# Patient Record
Sex: FEMALE | Race: WHITE | NOT HISPANIC OR LATINO
[De-identification: names, ages, dates, MRNs, and addresses within clinical notes are randomized per-mention and may not be internally consistent; named-entity substitution may affect disease eponyms.]

---

## 2019-12-27 ENCOUNTER — TRANSCRIPTION ENCOUNTER (OUTPATIENT)
Age: 49
End: 2019-12-27

## 2020-02-11 ENCOUNTER — RESULT REVIEW (OUTPATIENT)
Age: 50
End: 2020-02-11

## 2020-02-14 ENCOUNTER — APPOINTMENT (OUTPATIENT)
Dept: UROGYNECOLOGY | Facility: CLINIC | Age: 50
End: 2020-02-14
Payer: MEDICARE

## 2020-02-14 VITALS
HEART RATE: 74 BPM | DIASTOLIC BLOOD PRESSURE: 80 MMHG | WEIGHT: 280 LBS | HEIGHT: 62 IN | BODY MASS INDEX: 51.53 KG/M2 | SYSTOLIC BLOOD PRESSURE: 126 MMHG

## 2020-02-14 DIAGNOSIS — Z87.828 PERSONAL HISTORY OF OTHER (HEALED) PHYSICAL INJURY AND TRAUMA: ICD-10-CM

## 2020-02-14 DIAGNOSIS — E66.9 OBESITY, UNSPECIFIED: ICD-10-CM

## 2020-02-14 DIAGNOSIS — E07.9 DISORDER OF THYROID, UNSPECIFIED: ICD-10-CM

## 2020-02-14 DIAGNOSIS — Z78.9 OTHER SPECIFIED HEALTH STATUS: ICD-10-CM

## 2020-02-14 LAB
BILIRUB UR QL STRIP: NORMAL
CLARITY UR: CLEAR
COLLECTION METHOD: NORMAL
GLUCOSE UR-MCNC: NORMAL
HCG UR QL: 0.2 EU/DL
HGB UR QL STRIP.AUTO: NORMAL
KETONES UR-MCNC: NORMAL
LEUKOCYTE ESTERASE UR QL STRIP: NORMAL
NITRITE UR QL STRIP: NORMAL
PH UR STRIP: 7.5
PROT UR STRIP-MCNC: NORMAL
SP GR UR STRIP: 1.01

## 2020-02-14 PROCEDURE — 99202 OFFICE O/P NEW SF 15 MIN: CPT

## 2020-02-18 PROBLEM — Z87.828 HISTORY OF HEAD INJURY: Status: RESOLVED | Noted: 2020-02-18 | Resolved: 2020-02-18

## 2020-02-18 PROBLEM — E07.9 THYROID DISORDER: Status: ACTIVE | Noted: 2020-02-18

## 2020-02-18 PROBLEM — Z78.9 RARELY CONSUMES ALCOHOL: Status: ACTIVE | Noted: 2020-02-18

## 2020-02-18 PROBLEM — E66.9 OBESITY, UNSPECIFIED OBESITY SEVERITY, UNSPECIFIED OBESITY TYPE: Status: ACTIVE | Noted: 2020-02-18

## 2020-02-19 ENCOUNTER — TRANSCRIPTION ENCOUNTER (OUTPATIENT)
Age: 50
End: 2020-02-19

## 2020-03-06 ENCOUNTER — APPOINTMENT (OUTPATIENT)
Dept: UROGYNECOLOGY | Facility: CLINIC | Age: 50
End: 2020-03-06
Payer: MEDICARE

## 2020-03-06 ENCOUNTER — OUTPATIENT (OUTPATIENT)
Dept: OUTPATIENT SERVICES | Facility: HOSPITAL | Age: 50
LOS: 1 days | End: 2020-03-06
Payer: COMMERCIAL

## 2020-03-06 DIAGNOSIS — N39.3 STRESS INCONTINENCE (FEMALE) (MALE): ICD-10-CM

## 2020-03-06 DIAGNOSIS — Z01.818 ENCOUNTER FOR OTHER PREPROCEDURAL EXAMINATION: ICD-10-CM

## 2020-03-06 PROCEDURE — 51729 CYSTOMETROGRAM W/VP&UP: CPT

## 2020-03-06 PROCEDURE — 51797 INTRAABDOMINAL PRESSURE TEST: CPT | Mod: 26

## 2020-03-06 PROCEDURE — 51784 ANAL/URINARY MUSCLE STUDY: CPT | Mod: 26

## 2020-03-06 PROCEDURE — 51729 CYSTOMETROGRAM W/VP&UP: CPT | Mod: 26

## 2020-03-06 PROCEDURE — 51784 ANAL/URINARY MUSCLE STUDY: CPT

## 2020-03-06 PROCEDURE — 51797 INTRAABDOMINAL PRESSURE TEST: CPT

## 2020-03-18 ENCOUNTER — APPOINTMENT (OUTPATIENT)
Dept: UROGYNECOLOGY | Facility: CLINIC | Age: 50
End: 2020-03-18

## 2020-03-28 ENCOUNTER — INPATIENT (INPATIENT)
Facility: HOSPITAL | Age: 50
LOS: 15 days | Discharge: HOME | DRG: 871 | End: 2020-04-13
Attending: FAMILY MEDICINE | Admitting: HOSPITALIST
Payer: COMMERCIAL

## 2020-03-28 VITALS
HEART RATE: 92 BPM | WEIGHT: 272.93 LBS | DIASTOLIC BLOOD PRESSURE: 68 MMHG | OXYGEN SATURATION: 90 % | SYSTOLIC BLOOD PRESSURE: 97 MMHG | TEMPERATURE: 103 F | HEIGHT: 62 IN | RESPIRATION RATE: 26 BRPM

## 2020-03-28 DIAGNOSIS — J96.01 ACUTE RESPIRATORY FAILURE WITH HYPOXIA: ICD-10-CM

## 2020-03-28 LAB
ALBUMIN SERPL ELPH-MCNC: 3.6 G/DL — SIGNIFICANT CHANGE UP (ref 3.3–5.2)
ALP SERPL-CCNC: 59 U/L — SIGNIFICANT CHANGE UP (ref 40–120)
ALT FLD-CCNC: 17 U/L — SIGNIFICANT CHANGE UP
ANION GAP SERPL CALC-SCNC: 13 MMOL/L — SIGNIFICANT CHANGE UP (ref 5–17)
ANISOCYTOSIS BLD QL: SLIGHT — SIGNIFICANT CHANGE UP
APPEARANCE UR: ABNORMAL
APTT BLD: 28.3 SEC — SIGNIFICANT CHANGE UP (ref 27.5–36.3)
AST SERPL-CCNC: 27 U/L — SIGNIFICANT CHANGE UP
BASOPHILS # BLD AUTO: 0 K/UL — SIGNIFICANT CHANGE UP (ref 0–0.2)
BASOPHILS NFR BLD AUTO: 0 % — SIGNIFICANT CHANGE UP (ref 0–2)
BILIRUB SERPL-MCNC: 0.4 MG/DL — SIGNIFICANT CHANGE UP (ref 0.4–2)
BILIRUB UR-MCNC: NEGATIVE — SIGNIFICANT CHANGE UP
BUN SERPL-MCNC: 8 MG/DL — SIGNIFICANT CHANGE UP (ref 8–20)
CALCIUM SERPL-MCNC: 8.8 MG/DL — SIGNIFICANT CHANGE UP (ref 8.6–10.2)
CHLORIDE SERPL-SCNC: 99 MMOL/L — SIGNIFICANT CHANGE UP (ref 98–107)
CO2 SERPL-SCNC: 26 MMOL/L — SIGNIFICANT CHANGE UP (ref 22–29)
COLOR SPEC: ABNORMAL
CREAT SERPL-MCNC: 0.65 MG/DL — SIGNIFICANT CHANGE UP (ref 0.5–1.3)
DIFF PNL FLD: ABNORMAL
EOSINOPHIL # BLD AUTO: 0 K/UL — SIGNIFICANT CHANGE UP (ref 0–0.5)
EOSINOPHIL NFR BLD AUTO: 0 % — SIGNIFICANT CHANGE UP (ref 0–6)
FLU A RESULT: SIGNIFICANT CHANGE UP
FLU A RESULT: SIGNIFICANT CHANGE UP
FLUAV AG NPH QL: SIGNIFICANT CHANGE UP
FLUBV AG NPH QL: SIGNIFICANT CHANGE UP
GIANT PLATELETS BLD QL SMEAR: PRESENT — SIGNIFICANT CHANGE UP
GLUCOSE SERPL-MCNC: 92 MG/DL — SIGNIFICANT CHANGE UP (ref 70–99)
GLUCOSE UR QL: NEGATIVE — SIGNIFICANT CHANGE UP
HCG SERPL-ACNC: <4 MIU/ML — SIGNIFICANT CHANGE UP
HCT VFR BLD CALC: 41.4 % — SIGNIFICANT CHANGE UP (ref 34.5–45)
HGB BLD-MCNC: 12.6 G/DL — SIGNIFICANT CHANGE UP (ref 11.5–15.5)
INR BLD: 1.17 RATIO — HIGH (ref 0.88–1.16)
KETONES UR-MCNC: ABNORMAL
LACTATE BLDV-MCNC: 0.9 MMOL/L — SIGNIFICANT CHANGE UP (ref 0.5–2)
LEUKOCYTE ESTERASE UR-ACNC: ABNORMAL
LYMPHOCYTES # BLD AUTO: 0.58 K/UL — LOW (ref 1–3.3)
LYMPHOCYTES # BLD AUTO: 12.4 % — LOW (ref 13–44)
MANUAL SMEAR VERIFICATION: SIGNIFICANT CHANGE UP
MCHC RBC-ENTMCNC: 27.3 PG — SIGNIFICANT CHANGE UP (ref 27–34)
MCHC RBC-ENTMCNC: 30.4 GM/DL — LOW (ref 32–36)
MCV RBC AUTO: 89.8 FL — SIGNIFICANT CHANGE UP (ref 80–100)
MICROCYTES BLD QL: SLIGHT — SIGNIFICANT CHANGE UP
MONOCYTES # BLD AUTO: 0.29 K/UL — SIGNIFICANT CHANGE UP (ref 0–0.9)
MONOCYTES NFR BLD AUTO: 6.2 % — SIGNIFICANT CHANGE UP (ref 2–14)
NEUTROPHILS # BLD AUTO: 3.74 K/UL — SIGNIFICANT CHANGE UP (ref 1.8–7.4)
NEUTROPHILS NFR BLD AUTO: 79.6 % — HIGH (ref 43–77)
NITRITE UR-MCNC: POSITIVE
PH UR: 6.5 — SIGNIFICANT CHANGE UP (ref 5–8)
PLAT MORPH BLD: NORMAL — SIGNIFICANT CHANGE UP
PLATELET # BLD AUTO: 236 K/UL — SIGNIFICANT CHANGE UP (ref 150–400)
POLYCHROMASIA BLD QL SMEAR: SLIGHT — SIGNIFICANT CHANGE UP
POTASSIUM SERPL-MCNC: 3.8 MMOL/L — SIGNIFICANT CHANGE UP (ref 3.5–5.3)
POTASSIUM SERPL-SCNC: 3.8 MMOL/L — SIGNIFICANT CHANGE UP (ref 3.5–5.3)
PROT SERPL-MCNC: 7.4 G/DL — SIGNIFICANT CHANGE UP (ref 6.6–8.7)
PROT UR-MCNC: 100
PROTHROM AB SERPL-ACNC: 13.3 SEC — HIGH (ref 10–12.9)
RBC # BLD: 4.61 M/UL — SIGNIFICANT CHANGE UP (ref 3.8–5.2)
RBC # FLD: 14 % — SIGNIFICANT CHANGE UP (ref 10.3–14.5)
RBC BLD AUTO: ABNORMAL
RSV RESULT: SIGNIFICANT CHANGE UP
RSV RNA RESP QL NAA+PROBE: SIGNIFICANT CHANGE UP
SCHISTOCYTES BLD QL AUTO: SLIGHT — SIGNIFICANT CHANGE UP
SODIUM SERPL-SCNC: 138 MMOL/L — SIGNIFICANT CHANGE UP (ref 135–145)
SP GR SPEC: 1.01 — SIGNIFICANT CHANGE UP (ref 1.01–1.02)
UROBILINOGEN FLD QL: 1
VARIANT LYMPHS # BLD: 1.8 % — SIGNIFICANT CHANGE UP (ref 0–6)
WBC # BLD: 4.7 K/UL — SIGNIFICANT CHANGE UP (ref 3.8–10.5)
WBC # FLD AUTO: 4.7 K/UL — SIGNIFICANT CHANGE UP (ref 3.8–10.5)

## 2020-03-28 PROCEDURE — 93010 ELECTROCARDIOGRAM REPORT: CPT

## 2020-03-28 PROCEDURE — 99291 CRITICAL CARE FIRST HOUR: CPT

## 2020-03-28 PROCEDURE — 99223 1ST HOSP IP/OBS HIGH 75: CPT

## 2020-03-28 PROCEDURE — 71045 X-RAY EXAM CHEST 1 VIEW: CPT | Mod: 26

## 2020-03-28 RX ORDER — ACETAMINOPHEN 500 MG
975 TABLET ORAL ONCE
Refills: 0 | Status: COMPLETED | OUTPATIENT
Start: 2020-03-28 | End: 2020-03-28

## 2020-03-28 RX ORDER — SODIUM CHLORIDE 9 MG/ML
1000 INJECTION INTRAMUSCULAR; INTRAVENOUS; SUBCUTANEOUS
Refills: 0 | Status: COMPLETED | OUTPATIENT
Start: 2020-03-28 | End: 2020-03-29

## 2020-03-28 RX ORDER — AZITHROMYCIN 500 MG/1
250 TABLET, FILM COATED ORAL DAILY
Refills: 0 | Status: COMPLETED | OUTPATIENT
Start: 2020-03-29 | End: 2020-04-01

## 2020-03-28 RX ORDER — ONDANSETRON 8 MG/1
4 TABLET, FILM COATED ORAL EVERY 6 HOURS
Refills: 0 | Status: DISCONTINUED | OUTPATIENT
Start: 2020-03-28 | End: 2020-04-13

## 2020-03-28 RX ORDER — ALBUTEROL 90 UG/1
2 AEROSOL, METERED ORAL ONCE
Refills: 0 | Status: COMPLETED | OUTPATIENT
Start: 2020-03-28 | End: 2020-03-28

## 2020-03-28 RX ORDER — SERTRALINE 25 MG/1
100 TABLET, FILM COATED ORAL DAILY
Refills: 0 | Status: DISCONTINUED | OUTPATIENT
Start: 2020-03-28 | End: 2020-04-13

## 2020-03-28 RX ORDER — ENOXAPARIN SODIUM 100 MG/ML
40 INJECTION SUBCUTANEOUS DAILY
Refills: 0 | Status: DISCONTINUED | OUTPATIENT
Start: 2020-03-28 | End: 2020-04-11

## 2020-03-28 RX ORDER — SODIUM CHLORIDE 9 MG/ML
2500 INJECTION INTRAMUSCULAR; INTRAVENOUS; SUBCUTANEOUS ONCE
Refills: 0 | Status: COMPLETED | OUTPATIENT
Start: 2020-03-28 | End: 2020-03-28

## 2020-03-28 RX ORDER — AZITHROMYCIN 500 MG/1
500 TABLET, FILM COATED ORAL ONCE
Refills: 0 | Status: COMPLETED | OUTPATIENT
Start: 2020-03-28 | End: 2020-03-28

## 2020-03-28 RX ORDER — LEVOTHYROXINE SODIUM 125 MCG
137 TABLET ORAL DAILY
Refills: 0 | Status: DISCONTINUED | OUTPATIENT
Start: 2020-03-28 | End: 2020-04-13

## 2020-03-28 RX ORDER — ACETAMINOPHEN 500 MG
650 TABLET ORAL EVERY 6 HOURS
Refills: 0 | Status: DISCONTINUED | OUTPATIENT
Start: 2020-03-28 | End: 2020-04-13

## 2020-03-28 RX ADMIN — ALBUTEROL 2 PUFF(S): 90 AEROSOL, METERED ORAL at 15:59

## 2020-03-28 RX ADMIN — Medication 975 MILLIGRAM(S): at 16:24

## 2020-03-28 RX ADMIN — Medication 975 MILLIGRAM(S): at 15:59

## 2020-03-28 RX ADMIN — SODIUM CHLORIDE 2500 MILLILITER(S): 9 INJECTION INTRAMUSCULAR; INTRAVENOUS; SUBCUTANEOUS at 15:59

## 2020-03-28 RX ADMIN — AZITHROMYCIN 255 MILLIGRAM(S): 500 TABLET, FILM COATED ORAL at 18:24

## 2020-03-28 NOTE — ED ADULT TRIAGE NOTE - CHIEF COMPLAINT QUOTE
pt sent by Urgent care for low O2 sat 90's%, pt sister positive for Covid, pt c/o difficulty breathing, fever/chills, cough, x1 week

## 2020-03-28 NOTE — CONSULT NOTE ADULT - SUBJECTIVE AND OBJECTIVE BOX
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; has had extensive rehab      PAST SURGICAL HX  Brain surgery    History of Arthroscopy Shoulder Right      FAMILY HX  Family history of diabetes mellitus: Grandparent  Family history of hypertension: Father  Family history of osteoporosis: Mother  Family history of thyroid disease: Mother  Family history of Stroke risk: Mother      SOCIAL HX  lives w family  Nonsmoker    ALLERGIES  penicillin: Drug, Anaphylaxis  sulfa drugs: Drug Category, Anaphylaxis  shellfish: Food, Anaphylaxis  fish: Food, Anaphylaxis  Benadryl: Drug, Unknown      ROS  GEN + fever and chills; decreased appetite  RESP + cough occasionally productive of dark sputum            +SOB  CARD +STEARNS, no chest pain  GI: no N, V, D or abdominal pain  MSK + body aches        T(C): 37.5 (20 @ 18:26), Max: 39.5 (20 @ 13:00)  HR: 77 (20 @ 18:26) (77 - 92)  BP: 117/68 (20 @ 18:26) (97/68 - 117/68)  RR: 20 (20 @ 18:26) (20 - 26)  SpO2: 97% (20 @ 18:26) (90% - 97%)    PHYSICAL EXAM: evaluation precludes physical exam. Pertinent physical exam findings as per video conference with  teleNA at bedside is as follows:    Pt wearing 2 L NC and surgical face mask  Flat affect  ABle to answer in short sentences      flu A/ B/ RSV not detected                          12.6   4.70  )-----------( 236      ( 28 Mar 2020 15:10 )             41.4     28 Mar 2020 15:10    138    |  99     |  8.0    ----------------------------<  92     3.8     |  26.0   |  0.65     Ca    8.8        28 Mar 2020 15:10    TPro  7.4    /  Alb  3.6    /  TBili  0.4    /  DBili  x      /  AST  27     /  ALT  17     /  AlkPhos  59     28 Mar 2020 15:10    PT/INR - ( 28 Mar 2020 15:10 )   PT: 13.3 sec;   INR: 1.17 ratio         PTT - ( 28 Mar 2020 15:10 )  PTT:28.3 sec  CAPILLARY BLOOD GLUCOSE        LIVER FUNCTIONS - ( 28 Mar 2020 15:10 )  Alb: 3.6 g/dL / Pro: 7.4 g/dL / ALK PHOS: 59 U/L / ALT: 17 U/L / AST: 27 U/L / GGT: x           Urinalysis Basic - ( 28 Mar 2020 17:16 )    Color: Sowmya / Appearance: Slightly Turbid / S.010 / pH: x  Gluc: x / Ketone: Moderate  / Bili: Negative / Urobili: 1   Blood: x / Protein: 100 / Nitrite: Positive   Leuk Esterase: Small / RBC: x / WBC x   Sq Epi: x / Non Sq Epi: x / Bacteria: x          EXAM:  XR CHEST AP OR PA 1V                          PROCEDURE DATE:  2020        INTERPRETATION:  Portable chest radiograph        CLINICAL INFORMATION:   Short of breath. Cough and fever    TECHNIQUE:  Portable  AP view of the chest was obtained.    COMPARISON: No previous examinations are available for review.    FINDINGS:   The lungs  show multifocal lung perihilar and peripheral lung ill-defined area of airspace consolidations concerning for infectious pneumonia. No pleural effusion or pneumothorax.    Heart size within normal limits allowing for magnification effect of AP projection. Visualized osseous structures are intact.        IMPRESSION:   Multifocal diffuse scattered airspace consolidations concerning for infectious pneumonia...          EKG NSR 83  /420

## 2020-03-28 NOTE — ED PROVIDER NOTE - OBJECTIVE STATEMENT
48 yo female hx hypothyroid and TBI p/w SOB and fever; patient lives with sister who is confirmed covid positive; has been developing progressive fever, myalgias and cough; no relief with tylenol and other otc remedies; today, went to urgent care where hypoxia was noted, sent to ED; increasing SOB with exertion;  90% on room air while at rest; on nasal canula improved to 98%.    Meds: sertraline, synthroid, nabumetone; amantadine, mirena, zyrtec, donepezil

## 2020-03-28 NOTE — ED PROVIDER NOTE - NS ED ROS FT
Const: +fever, chills  HEENT: Denies blurry vision, sore throat  Neck: Denies neck pain/stiffness  Resp: +coughing, SOB  Cardiovascular: Denies CP, palpitations, LE edema  GI: Deneis nausea, vomiting, abdominal pain, diarrhea, constipation, blood in stool  : Denies urinary frequency/urgency/dysuria, hematuria  MSK: Denies back pain  Neuro: Denies HA, dizziness, numbness, weakness  Skin: Denies rashes.

## 2020-03-28 NOTE — CONSULT NOTE ADULT - ASSESSMENT
49 year old female w hypothyroid and TBI    #Hypoxemia  #Fever  #lymphopenia  #Viral PNA likely COVID  1. admit to med, monitored bed w continuous pulse ox  2. zithromax  3. supplemental O2  4. procalcitonin, CRP, ferritin, LDH, trop  5. follow up cx    #Hypotension  likely due to fever and decreased po intake  1. s/p 2500 cc IV  2. IV NS 75 cc/hr until taking po      #Hypothyroid  1. levothyroxine    #TBI

## 2020-03-28 NOTE — ED PROVIDER NOTE - PHYSICAL EXAMINATION
Const: Awake, alert and oriented. In no acute distress. Well appearing.  HEENT: NC/AT. Moist mucous membranes.  Eyes: No scleral icterus. EOMI.  Neck:. Soft and supple. Full ROM without pain.  Cardiac: Regular rate and rhythm. +S1/S2. No murmurs. Peripheral pulses 2+ and symmetric. No LE edema.  Resp: Speaking in full sentences. crackles b/l  Abd: Soft, non-tender, non-distended. Normal bowel sounds in all 4 quadrants. No guarding or rebound.  Back: Spine midline and non-tender. No CVAT.  Skin: No rashes, abrasions or lacerations.  Lymph: No cervical lymphadenopathy.

## 2020-03-28 NOTE — ED ADULT NURSE REASSESSMENT NOTE - NS ED NURSE REASSESS COMMENT FT1
assumed care of pt pt resting in stretcher ambulated to bathroom with a steady gait, dyspnea on exertion. speaking clear full sentences.

## 2020-03-28 NOTE — CONSULT NOTE ADULT - ATTENDING COMMENTS
VTE enoxaparin  med rec [facility] [phone] VTE enoxaparin  med rec called [facility] [phone] at 19:20      Follow up  check pt list of meds  she could not read doses  older list amantadine, donezepril, zyrtec not listed in pharmacy which she has used since January  ID re HC/statin

## 2020-03-29 DIAGNOSIS — R09.02 HYPOXEMIA: ICD-10-CM

## 2020-03-29 DIAGNOSIS — I10 ESSENTIAL (PRIMARY) HYPERTENSION: ICD-10-CM

## 2020-03-29 DIAGNOSIS — Z98.890 OTHER SPECIFIED POSTPROCEDURAL STATES: Chronic | ICD-10-CM

## 2020-03-29 DIAGNOSIS — S06.9X9S UNSPECIFIED INTRACRANIAL INJURY WITH LOSS OF CONSCIOUSNESS OF UNSPECIFIED DURATION, SEQUELA: ICD-10-CM

## 2020-03-29 DIAGNOSIS — E03.9 HYPOTHYROIDISM, UNSPECIFIED: ICD-10-CM

## 2020-03-29 DIAGNOSIS — J12.9 VIRAL PNEUMONIA, UNSPECIFIED: ICD-10-CM

## 2020-03-29 LAB
ANION GAP SERPL CALC-SCNC: 14 MMOL/L — SIGNIFICANT CHANGE UP (ref 5–17)
BUN SERPL-MCNC: 9 MG/DL — SIGNIFICANT CHANGE UP (ref 8–20)
CALCIUM SERPL-MCNC: 8.1 MG/DL — LOW (ref 8.6–10.2)
CHLORIDE SERPL-SCNC: 104 MMOL/L — SIGNIFICANT CHANGE UP (ref 98–107)
CO2 SERPL-SCNC: 25 MMOL/L — SIGNIFICANT CHANGE UP (ref 22–29)
CREAT SERPL-MCNC: 0.49 MG/DL — LOW (ref 0.5–1.3)
CRP SERPL-MCNC: 8.17 MG/DL — HIGH (ref 0–0.4)
CULTURE RESULTS: SIGNIFICANT CHANGE UP
FERRITIN SERPL-MCNC: 290 NG/ML — HIGH (ref 15–150)
GLUCOSE SERPL-MCNC: 103 MG/DL — HIGH (ref 70–99)
HBA1C BLD-MCNC: 6.2 % — HIGH (ref 4–5.6)
LACTATE SERPL-SCNC: 0.7 MMOL/L — SIGNIFICANT CHANGE UP (ref 0.5–2)
LDH SERPL L TO P-CCNC: 305 U/L — HIGH (ref 98–192)
PHOSPHATE SERPL-MCNC: 2.5 MG/DL — SIGNIFICANT CHANGE UP (ref 2.4–4.7)
POTASSIUM SERPL-MCNC: 4.3 MMOL/L — SIGNIFICANT CHANGE UP (ref 3.5–5.3)
POTASSIUM SERPL-SCNC: 4.3 MMOL/L — SIGNIFICANT CHANGE UP (ref 3.5–5.3)
PROCALCITONIN SERPL-MCNC: 0.08 NG/ML — SIGNIFICANT CHANGE UP (ref 0.02–0.1)
SARS-COV-2 RNA SPEC QL NAA+PROBE: DETECTED
SODIUM SERPL-SCNC: 143 MMOL/L — SIGNIFICANT CHANGE UP (ref 135–145)
SPECIMEN SOURCE: SIGNIFICANT CHANGE UP
TROPONIN T SERPL-MCNC: <0.01 NG/ML — SIGNIFICANT CHANGE UP (ref 0–0.06)

## 2020-03-29 PROCEDURE — 99233 SBSQ HOSP IP/OBS HIGH 50: CPT

## 2020-03-29 PROCEDURE — 93010 ELECTROCARDIOGRAM REPORT: CPT

## 2020-03-29 RX ADMIN — SERTRALINE 100 MILLIGRAM(S): 25 TABLET, FILM COATED ORAL at 12:13

## 2020-03-29 RX ADMIN — Medication 137 MICROGRAM(S): at 05:37

## 2020-03-29 RX ADMIN — ENOXAPARIN SODIUM 40 MILLIGRAM(S): 100 INJECTION SUBCUTANEOUS at 12:13

## 2020-03-29 RX ADMIN — AZITHROMYCIN 250 MILLIGRAM(S): 500 TABLET, FILM COATED ORAL at 12:13

## 2020-03-29 RX ADMIN — SODIUM CHLORIDE 75 MILLILITER(S): 9 INJECTION INTRAMUSCULAR; INTRAVENOUS; SUBCUTANEOUS at 12:13

## 2020-03-29 NOTE — H&P ADULT - HISTORY OF PRESENT ILLNESS
50 y/o female with old TBI, Hypothyroidism, DVT s/p IVC filter who resides at home with sister who helps take care of her. Patient with increasing dry cough, diffuse body aches, and fever that started last week. Patient sister is COVID pos. She was taken UC and found to be febrile and hypoxic. She was then sent to the ED. In the ED she was found to have hypoxia w/ RA sat of 90, fever of 103, b/l interstitial infiltrates. She was given IVF, Zithromax, oxygen, and albuterol in ED. Patient denies CP, HA, focal weakness, rash.

## 2020-03-29 NOTE — H&P ADULT - PROBLEM SELECTOR PLAN 1
Likely COVID based on sister having it, and clinical picture. Cont. supportive care, oxygen, spirometer, vitamins, prn albuterol MDI, OOB, DVT-P, Ambulate, MAP/SBP above goal, lactate normal, cap refill < 3 seconds, prn tylenol. Check Inflammatory markers, Start on Plaquenil pending pos. COVID swab. Low threshold for MICU eval if hypoxia worsens. Patient is FULL CODE     Need to verify medication list from family/pharmacy

## 2020-03-29 NOTE — PROGRESS NOTE ADULT - SUBJECTIVE AND OBJECTIVE BOX
JONNY ATKINSON    [id number]    49y      Female    Patient is a 49y old  Female who presents with a chief complaint of Hypoxia  Viral PNA  R/O COVID (29 Mar 2020 05:20)      INTERVAL HPI/OVERNIGHT EVENTS:    Patient is feeling not much improvement of her symptoms, but she does not feel worsening as well, patient has no chest pain, sob, dizziness   REVIEW OF SYSTEMS:    CONSTITUTIONAL: No fever, has fatigue  RESPIRATORY: has cough and  shortness of breath  CARDIOVASCULAR: No chest pain, palpitations  GASTROINTESTINAL: No abdominal, No nausea, vomiting  NEUROLOGICAL: No headaches,  loss of strength.  MISCELLANEOUS: No joint swelling or pain       Vital Signs Last 24 Hrs  T(C): 37 (29 Mar 2020 16:50), Max: 37.8 (29 Mar 2020 05:57)  T(F): 98.6 (29 Mar 2020 16:50), Max: 100 (29 Mar 2020 05:57)  HR: 84 (29 Mar 2020 16:50) (76 - 93)  BP: 112/72 (29 Mar 2020 16:50) (110/67 - 123/72)  BP(mean): 81 (29 Mar 2020 05:20) (81 - 81)  RR: 25 (29 Mar 2020 16:50) (18 - 25)  SpO2: 94% (29 Mar 2020 16:50) (90% - 96%)    PHYSICAL EXAM:    GENERAL: Middle age female looking in mild respiratory distress    HEENT: PERRL, +EOMI  NECK: soft, Supple, No JVD,   CHEST/LUNG: decrease air entry bilaterally; No wheezing, has b/l crackles   HEART: S1S2+, Regular rate and rhythm; No murmurs  ABDOMEN: Soft, Nontender, Nondistended; Bowel sounds present  EXTREMITIES:  1+ Peripheral Pulses, No edema  SKIN: No rashes or lesions  NEURO: AAOX3, no focal deficits, no motor r sensory loss  PSYCH: normal mood      LABS:                        12.6   4.70  )-----------( 236      ( 28 Mar 2020 15:10 )             41.4         143  |  104  |  9.0  ----------------------------<  103<H>  4.3   |  25.0  |  0.49<L>    Ca    8.1<L>      29 Mar 2020 07:27  Phos  2.5     -    TPro  7.4  /  Alb  3.6  /  TBili  0.4  /  DBili  x   /  AST  27  /  ALT  17  /  AlkPhos  59  -    PT/INR - ( 28 Mar 2020 15:10 )   PT: 13.3 sec;   INR: 1.17 ratio         PTT - ( 28 Mar 2020 15:10 )  PTT:28.3 sec  Urinalysis Basic - ( 28 Mar 2020 17:16 )    Color: Sowmya / Appearance: Slightly Turbid / S.010 / pH: x  Gluc: x / Ketone: Moderate  / Bili: Negative / Urobili: 1   Blood: x / Protein: 100 / Nitrite: Positive   Leuk Esterase: Small / RBC: x / WBC x   Sq Epi: x / Non Sq Epi: x / Bacteria: x          I&O's Summary      MEDICATIONS  (STANDING):  azithromycin   Tablet 250 milliGRAM(s) Oral daily  enoxaparin Injectable 40 milliGRAM(s) SubCutaneous daily  levothyroxine 137 MICROGram(s) Oral daily  sertraline 100 milliGRAM(s) Oral daily    MEDICATIONS  (PRN):  acetaminophen   Tablet .. 650 milliGRAM(s) Oral every 6 hours PRN Temp greater or equal to 38C (100.4F), Mild Pain (1 - 3)  aluminum hydroxide/magnesium hydroxide/simethicone Suspension 30 milliLiter(s) Oral every 4 hours PRN Dyspepsia  ondansetron Injectable 4 milliGRAM(s) IV Push every 6 hours PRN Nausea

## 2020-03-29 NOTE — PROGRESS NOTE ADULT - PROBLEM SELECTOR PLAN 1
COVID PCR is positive, she has hx of exposure, Cont. supportive care, oxygen and titrate accordingly, spirometer, vitamins, prn albuterol MDI, OOB, DVT-P, Ambulate, MAP/SBP above goal, lactate normal, cap refill < 3 seconds, prn tylenol Inflammatory markers, will see if she worsens will get ID consult to Start on Plaquenil pending pos. COVID swab. Low threshold for MICU eval if hypoxia worsens. Patient is FULL CODE     Need to verify medication list from family/pharmacy COVID PCR is positive, she has hx of exposure, Cont. supportive care, oxygen and titrate accordingly, spirometer, vitamins, prn albuterol MDI, OOB, DVT-P, Ambulate, MAP/SBP above goal, lactate normal, cap refill < 3 seconds, prn tylenol Inflammatory markers, will see if she worsens will get ID consult to Start on Plaquenil if she worsens,  will discuss again with family about verify  of medication list.

## 2020-03-30 PROCEDURE — 99232 SBSQ HOSP IP/OBS MODERATE 35: CPT

## 2020-03-30 RX ADMIN — AZITHROMYCIN 250 MILLIGRAM(S): 500 TABLET, FILM COATED ORAL at 11:29

## 2020-03-30 RX ADMIN — Medication 650 MILLIGRAM(S): at 06:15

## 2020-03-30 RX ADMIN — Medication 650 MILLIGRAM(S): at 18:46

## 2020-03-30 RX ADMIN — Medication 137 MICROGRAM(S): at 05:37

## 2020-03-30 RX ADMIN — ENOXAPARIN SODIUM 40 MILLIGRAM(S): 100 INJECTION SUBCUTANEOUS at 11:29

## 2020-03-30 RX ADMIN — Medication 650 MILLIGRAM(S): at 05:37

## 2020-03-30 RX ADMIN — SERTRALINE 100 MILLIGRAM(S): 25 TABLET, FILM COATED ORAL at 11:29

## 2020-03-30 NOTE — CDI QUERY NOTE - NSCDIOTHERTXTBX2_GEN_ALL_CORE_FT
Can you please clarify if a BMI of 49.9 supports a clinical diagnosis?    A. Morbid obesity with BMI of 49.9  B. Severe obesity with BMI of 49.9  C. Other, please specify  D. Not clinically significant    Supporting Documentations:    Body Measurements:  • 	 used  • Dosing Weight (KILOGRAMS)	123.8 kg  • Dosing Weight  (POUNDS)	272.9 Pound(s)  • How was the weight captured?	stated  • Height (FEET)	5 Feet  • Height (INCHES)	2 Inch(s)  • Height (CENTIMETERS)	157.48 Centimeter(s)  • Height type	stated  • BSA (m2)	2.18 Meter Squared  • BMI (kG/m2)	49.9  • Ideal Body Weight(kg)	50 kg

## 2020-03-30 NOTE — PROGRESS NOTE ADULT - SUBJECTIVE AND OBJECTIVE BOX
JONNY ATKINSON    [id number]    49y      Female    Patient is a 49y old  Female who presents with a chief complaint of Hypoxia  Viral PNA  R/O COVID (29 Mar 2020 16:00)      INTERVAL HPI/OVERNIGHT EVENTS:    patient is feeling some improvement of cough and SOB, she fever, denies chest pain, dizziness    REVIEW OF SYSTEMS:    CONSTITUTIONAL: No fever, fatigue  RESPIRATORY: No cough, No shortness of breath  CARDIOVASCULAR: No chest pain, palpitations  GASTROINTESTINAL: No abdominal, No nausea, vomiting  NEUROLOGICAL: No headaches,  loss of strength.  MISCELLANEOUS: No joint swelling or pain       Vital Signs Last 24 Hrs  T(C): 36.9 (30 Mar 2020 09:55), Max: 37.3 (29 Mar 2020 22:02)  T(F): 98.5 (30 Mar 2020 09:55), Max: 99.2 (29 Mar 2020 22:02)  HR: 82 (30 Mar 2020 09:55) (82 - 91)  BP: 121/83 (30 Mar 2020 09:55) (112/72 - 137/88)  RR: 20 (30 Mar 2020 09:55) (19 - 25)  SpO2: 94% (30 Mar 2020 09:55) (93% - 95%)    PHYSICAL EXAM:    GENERAL: Middle age female looking comfortable   HEENT: PERRL, +EOMI  NECK: soft, Supple, No JVD,   CHEST/LUNG: decrease air entry bilaterally; No wheezing, has b/l crackles   HEART: S1S2+, Regular rate and rhythm; No murmurs  ABDOMEN: Soft, Nontender, Nondistended; Bowel sounds present  EXTREMITIES:  1+ Peripheral Pulses, No edema  SKIN: No rashes or lesions  NEURO: AAOX3, no focal deficit   PSYCH: normal mood      LABS:        143  |  104  |  9.0  ----------------------------<  103<H>  4.3   |  25.0  |  0.49<L>    Ca    8.1<L>      29 Mar 2020 07:27  Phos  2.5             Urinalysis Basic - ( 28 Mar 2020 17:16 )    Color: Sowmya / Appearance: Slightly Turbid / S.010 / pH: x  Gluc: x / Ketone: Moderate  / Bili: Negative / Urobili: 1   Blood: x / Protein: 100 / Nitrite: Positive   Leuk Esterase: Small / RBC: x / WBC x   Sq Epi: x / Non Sq Epi: x / Bacteria: x          I&O's Summary    29 Mar 2020 07:01  -  30 Mar 2020 07:00  --------------------------------------------------------  IN: 600 mL / OUT: 0 mL / NET: 600 mL        MEDICATIONS  (STANDING):  azithromycin   Tablet 250 milliGRAM(s) Oral daily  enoxaparin Injectable 40 milliGRAM(s) SubCutaneous daily  levothyroxine 137 MICROGram(s) Oral daily  sertraline 100 milliGRAM(s) Oral daily    MEDICATIONS  (PRN):  acetaminophen   Tablet .. 650 milliGRAM(s) Oral every 6 hours PRN Temp greater or equal to 38C (100.4F), Mild Pain (1 - 3)  aluminum hydroxide/magnesium hydroxide/simethicone Suspension 30 milliLiter(s) Oral every 4 hours PRN Dyspepsia  ondansetron Injectable 4 milliGRAM(s) IV Push every 6 hours PRN Nausea

## 2020-03-30 NOTE — CDI QUERY NOTE - NSCDIOTHERTXTBX_GEN_ALL_CORE_HH
Patient presented febrile with 103.1 temperature and tachypneic 26, now found to have pneumonia positive covid 19, can you please clarify if this supports a clinical diagnosis?    A. Sepsis present on admission due to pneumonia positive CoVID-19  B. Other, please specify  C. Not clinically significant    Supporting Documentations:    3/28/20 @ 1300 ED Adult Flow sheet:  Temp (F): 103.1 Degrees F  Temp (C): 39.5 Degrees C  Respiration Rate (breaths/min): 26 /min  SpO2 (%) SpO2 (%): 90 %  O2 Delivery/Oxygen Delivery Method Patient On (Patient Delivery Method): room air    3/29/20 @ 0004 ED Adult Flow Sheet;  Temp (F): 99.2 Degrees F  Temp (C): 37.3 Degrees C  Respiration Rate (breaths/min): 24 /min  SpO2 (%): 96 %  O2 Delivery/Oxygen Delivery Method Patient On (Patient Delivery Method): nasal cannula  Oxygen Flow (L/min): 2 L/min    Labs:  3/29/20: CoVID-19 PCR- Detected   H&P:  Problem/Plan - 1:  •? Problem: Viral pneumonia.  Plan: Likely COVID based on sister having it, and clinical picture. Cont. supportive care, oxygen, spirometer, vitamins, prn albuterol MDI, OOB, DVT-P, Ambulate, MAP/SBP above goal, lactate normal, cap refill < 3 seconds, prn tylenol. Check Inflammatory markers, Start on Plaquenil pending pos. COVID swab. Low threshold for MICU eval if hypoxia worsens. Patient is FULL CODE     3/30/20 Progress Note Adult Hospitalist:  Problem/Plan - 1:  •? Problem: Viral pneumonia.  Plan: COVID PCR is positive, she has hx of exposure, Cont. supportive care, oxygen and titrate accordingly, spirometer, vitamins, prn albuterol MDI, OOB, DVT-P, Ambulate, MAP/SBP above goal, lactate normal, cap refill < 3 seconds, prn tylenol, Inflammatory markers, blood cultures pending, will discuss again with family about verify  of medication list.

## 2020-03-30 NOTE — PROGRESS NOTE ADULT - PROBLEM SELECTOR PLAN 1
COVID PCR is positive, she has hx of exposure, Cont. supportive care, oxygen and titrate accordingly, spirometer, vitamins, prn albuterol MDI, OOB, DVT-P, Ambulate, MAP/SBP above goal, lactate normal, cap refill < 3 seconds, prn tylenol, Inflammatory markers, blood cultures pending, will discuss again with family about verify  of medication list.

## 2020-03-31 PROCEDURE — 99232 SBSQ HOSP IP/OBS MODERATE 35: CPT

## 2020-03-31 RX ADMIN — AZITHROMYCIN 250 MILLIGRAM(S): 500 TABLET, FILM COATED ORAL at 11:37

## 2020-03-31 RX ADMIN — Medication 650 MILLIGRAM(S): at 22:41

## 2020-03-31 RX ADMIN — ENOXAPARIN SODIUM 40 MILLIGRAM(S): 100 INJECTION SUBCUTANEOUS at 11:37

## 2020-03-31 RX ADMIN — SERTRALINE 100 MILLIGRAM(S): 25 TABLET, FILM COATED ORAL at 11:37

## 2020-03-31 RX ADMIN — Medication 137 MICROGRAM(S): at 05:31

## 2020-03-31 NOTE — PROGRESS NOTE ADULT - PROBLEM SELECTOR PLAN 1
COVID PCR is positive, she has hx of exposure, Cont. supportive care, oxygen and titrate accordingly, spirometer, vitamins, prn albuterol MDI, OOB, DVT-P, Ambulate, prn tylenol, Inflammatory markers, blood cultures negative, prone position every 15 to 30 minutes.

## 2020-03-31 NOTE — PROGRESS NOTE ADULT - SUBJECTIVE AND OBJECTIVE BOX
JONNY ATKINSON    [id number]    49y      Female    Patient is a 49y old  Female who presents with a chief complaint of Hypoxia  Viral PNA  R/O COVID (30 Mar 2020 15:18)      INTERVAL HPI/OVERNIGHT EVENTS:    REVIEW OF SYSTEMS:    CONSTITUTIONAL: No fever, fatigue  RESPIRATORY: No cough, No shortness of breath  CARDIOVASCULAR: No chest pain, palpitations  GASTROINTESTINAL: No abdominal, No nausea, vomiting  NEUROLOGICAL: No headaches,  loss of strength.  MISCELLANEOUS: No joint swelling or pain       Vital Signs Last 24 Hrs  T(C): 36.8 (31 Mar 2020 06:02), Max: 37.3 (30 Mar 2020 20:43)  T(F): 98.3 (31 Mar 2020 06:02), Max: 99.1 (30 Mar 2020 20:43)  HR: 88 (31 Mar 2020 06:02) (88 - 88)  BP: 148/82 (31 Mar 2020 06:02) (128/76 - 148/82)  BP(mean): --  RR: 24 (31 Mar 2020 06:02) (20 - 24)  SpO2: 93% (31 Mar 2020 06:02) (93% - 93%)    PHYSICAL EXAM:    GENERAL: Elderly male looking   HEENT: PERRL, +EOMI  NECK: soft, Supple, No JVD,   CHEST/LUNG: Clear to auscultate bilaterally; No wheezing  HEART: S1S2+, Regular rate and rhythm; No murmurs, rubs, or gallops  ABDOMEN: Soft, Nontender, Nondistended; Bowel sounds present  EXTREMITIES:  2+ Peripheral Pulses, No clubbing, cyanosis, or edema  SKIN: No rashes or lesions  NEURO: AAOX3, no focal deficits, no motor r sensory loss  PSYCH: normal mood      LABS:                  I&O's Summary      MEDICATIONS  (STANDING):  azithromycin   Tablet 250 milliGRAM(s) Oral daily  enoxaparin Injectable 40 milliGRAM(s) SubCutaneous daily  levothyroxine 137 MICROGram(s) Oral daily  sertraline 100 milliGRAM(s) Oral daily    MEDICATIONS  (PRN):  acetaminophen   Tablet .. 650 milliGRAM(s) Oral every 6 hours PRN Temp greater or equal to 38C (100.4F), Mild Pain (1 - 3)  aluminum hydroxide/magnesium hydroxide/simethicone Suspension 30 milliLiter(s) Oral every 4 hours PRN Dyspepsia  ondansetron Injectable 4 milliGRAM(s) IV Push every 6 hours PRN Nausea JONNY ATKINSON    [id number]    49y      Female    Patient is a 49y old  Female who presents with a chief complaint of Hypoxia  Viral PNA  R/O COVID (30 Mar 2020 15:18)      INTERVAL HPI/OVERNIGHT EVENTS:    patient still having some sob and cough, denies chest pain, dizziness.     REVIEW OF SYSTEMS:    CONSTITUTIONAL: No fever, has fatigue  RESPIRATORY: has cough and  shortness of breath  CARDIOVASCULAR: No chest pain, palpitations  GASTROINTESTINAL: No abdominal, No nausea, vomiting  NEUROLOGICAL: No headaches,  loss of strength.  MISCELLANEOUS: No joint swelling or pain     Vital Signs Last 24 Hrs  T(C): 36.8 (31 Mar 2020 06:02), Max: 37.3 (30 Mar 2020 20:43)  T(F): 98.3 (31 Mar 2020 06:02), Max: 99.1 (30 Mar 2020 20:43)  HR: 88 (31 Mar 2020 06:02) (88 - 88)  BP: 148/82 (31 Mar 2020 06:02) (128/76 - 148/82)  RR: 24 (31 Mar 2020 06:02) (20 - 24)  SpO2: 93% (31 Mar 2020 06:02) (93% - 93%)    PHYSICAL EXAM:    GENERAL: Middle age female looking comfortable   HEENT: PERRL, +EOMI  NECK: soft, Supple, No JVD,   CHEST/LUNG: decrease air entry bilaterally; No wheezing, has b/l crackles   HEART: S1S2+, Regular rate and rhythm; No murmurs  ABDOMEN: Soft, Nontender, Nondistended; Bowel sounds present  EXTREMITIES:  1+ Peripheral Pulses, No edema  SKIN: No rashes or lesions  NEURO: AAOX3, no focal deficit   PSYCH: normal mood      LABS:                  I&O's Summary      MEDICATIONS  (STANDING):  azithromycin   Tablet 250 milliGRAM(s) Oral daily  enoxaparin Injectable 40 milliGRAM(s) SubCutaneous daily  levothyroxine 137 MICROGram(s) Oral daily  sertraline 100 milliGRAM(s) Oral daily    MEDICATIONS  (PRN):  acetaminophen   Tablet .. 650 milliGRAM(s) Oral every 6 hours PRN Temp greater or equal to 38C (100.4F), Mild Pain (1 - 3)  aluminum hydroxide/magnesium hydroxide/simethicone Suspension 30 milliLiter(s) Oral every 4 hours PRN Dyspepsia  ondansetron Injectable 4 milliGRAM(s) IV Push every 6 hours PRN Nausea

## 2020-04-01 LAB
ALBUMIN SERPL ELPH-MCNC: 3.1 G/DL — LOW (ref 3.3–5.2)
ALP SERPL-CCNC: 55 U/L — SIGNIFICANT CHANGE UP (ref 40–120)
ALT FLD-CCNC: 29 U/L — SIGNIFICANT CHANGE UP
ANION GAP SERPL CALC-SCNC: 14 MMOL/L — SIGNIFICANT CHANGE UP (ref 5–17)
AST SERPL-CCNC: 40 U/L — HIGH
BILIRUB SERPL-MCNC: 0.3 MG/DL — LOW (ref 0.4–2)
BUN SERPL-MCNC: 7 MG/DL — LOW (ref 8–20)
CALCIUM SERPL-MCNC: 8.8 MG/DL — SIGNIFICANT CHANGE UP (ref 8.6–10.2)
CHLORIDE SERPL-SCNC: 100 MMOL/L — SIGNIFICANT CHANGE UP (ref 98–107)
CO2 SERPL-SCNC: 25 MMOL/L — SIGNIFICANT CHANGE UP (ref 22–29)
CREAT SERPL-MCNC: 0.46 MG/DL — LOW (ref 0.5–1.3)
CRP SERPL-MCNC: 3.22 MG/DL — HIGH (ref 0–0.4)
ERYTHROCYTE [SEDIMENTATION RATE] IN BLOOD: 45 MM/HR — HIGH (ref 0–20)
GLUCOSE SERPL-MCNC: 119 MG/DL — HIGH (ref 70–99)
HCT VFR BLD CALC: 40.5 % — SIGNIFICANT CHANGE UP (ref 34.5–45)
HGB BLD-MCNC: 12.6 G/DL — SIGNIFICANT CHANGE UP (ref 11.5–15.5)
MCHC RBC-ENTMCNC: 27.3 PG — SIGNIFICANT CHANGE UP (ref 27–34)
MCHC RBC-ENTMCNC: 31.1 GM/DL — LOW (ref 32–36)
MCV RBC AUTO: 87.9 FL — SIGNIFICANT CHANGE UP (ref 80–100)
PLATELET # BLD AUTO: 283 K/UL — SIGNIFICANT CHANGE UP (ref 150–400)
POTASSIUM SERPL-MCNC: 3.5 MMOL/L — SIGNIFICANT CHANGE UP (ref 3.5–5.3)
POTASSIUM SERPL-SCNC: 3.5 MMOL/L — SIGNIFICANT CHANGE UP (ref 3.5–5.3)
PROT SERPL-MCNC: 6.5 G/DL — LOW (ref 6.6–8.7)
RBC # BLD: 4.61 M/UL — SIGNIFICANT CHANGE UP (ref 3.8–5.2)
RBC # FLD: 13.8 % — SIGNIFICANT CHANGE UP (ref 10.3–14.5)
SODIUM SERPL-SCNC: 139 MMOL/L — SIGNIFICANT CHANGE UP (ref 135–145)
WBC # BLD: 3.91 K/UL — SIGNIFICANT CHANGE UP (ref 3.8–10.5)
WBC # FLD AUTO: 3.91 K/UL — SIGNIFICANT CHANGE UP (ref 3.8–10.5)

## 2020-04-01 PROCEDURE — 99232 SBSQ HOSP IP/OBS MODERATE 35: CPT

## 2020-04-01 RX ADMIN — Medication 100 MILLIGRAM(S): at 20:23

## 2020-04-01 RX ADMIN — ENOXAPARIN SODIUM 40 MILLIGRAM(S): 100 INJECTION SUBCUTANEOUS at 10:43

## 2020-04-01 RX ADMIN — SERTRALINE 100 MILLIGRAM(S): 25 TABLET, FILM COATED ORAL at 10:42

## 2020-04-01 RX ADMIN — Medication 137 MICROGRAM(S): at 05:25

## 2020-04-01 RX ADMIN — Medication 650 MILLIGRAM(S): at 19:02

## 2020-04-01 RX ADMIN — AZITHROMYCIN 250 MILLIGRAM(S): 500 TABLET, FILM COATED ORAL at 10:43

## 2020-04-01 NOTE — PROGRESS NOTE ADULT - PROBLEM SELECTOR PLAN 1
COVID PCR is positive, she has hx of exposure, Cont. supportive care, oxygen and titrate accordingly, spirometer, vitamins, prn albuterol MDI, OOB, DVT-P, Ambulate, prn tylenol, Inflammatory markers, blood cultures negative, prone position every 15 to 30 minutes, cough medication.

## 2020-04-01 NOTE — PROGRESS NOTE ADULT - SUBJECTIVE AND OBJECTIVE BOX
JONNY ATKINSON    [id number]    49y      Female    Patient is a 49y old  Female who presents with a chief complaint of Hypoxia  Viral PNA  R/O COVID (31 Mar 2020 15:24)    INTERVAL HPI/OVERNIGHT EVENTS:    patient is having cough, no worsening of SOB, she fever, denies chest pain, dizziness.     REVIEW OF SYSTEMS:    CONSTITUTIONAL: No fever, has fatigue  RESPIRATORY: has cough and  shortness of breath  CARDIOVASCULAR: No chest pain, palpitations  GASTROINTESTINAL: No abdominal, No nausea, vomiting  NEUROLOGICAL: No headaches,  loss of strength.  MISCELLANEOUS: No joint swelling or pain         Vital Signs Last 24 Hrs  T(C): 37.6 (01 Apr 2020 10:38), Max: 38.3 (31 Mar 2020 23:05)  T(F): 99.7 (01 Apr 2020 10:38), Max: 101 (31 Mar 2020 23:05)  HR: 96 (01 Apr 2020 10:38) (80 - 96)  BP: 121/67 (01 Apr 2020 10:38) (116/72 - 123/79)  RR: 20 (01 Apr 2020 10:38) (20 - 22)  SpO2: 94% (01 Apr 2020 10:38) (92% - 94%)    PHYSICAL EXAM:    GENERAL: Middle age female looking comfortable   HEENT: PERRL, +EOMI  NECK: soft, Supple, No JVD,   CHEST/LUNG: decrease air entry bilaterally; No wheezing, has b/l crackles   HEART: S1S2+, Regular rate and rhythm; No murmurs  ABDOMEN: Soft, Nontender, Nondistended; Bowel sounds present  EXTREMITIES:  1+ Peripheral Pulses, No edema  SKIN: No rashes or lesions  NEURO: AAOX3, no focal deficit   PSYCH: normal mood        MEDICATIONS  (STANDING):  enoxaparin Injectable 40 milliGRAM(s) SubCutaneous daily  levothyroxine 137 MICROGram(s) Oral daily  sertraline 100 milliGRAM(s) Oral daily    MEDICATIONS  (PRN):  acetaminophen   Tablet .. 650 milliGRAM(s) Oral every 6 hours PRN Temp greater or equal to 38C (100.4F), Mild Pain (1 - 3)  aluminum hydroxide/magnesium hydroxide/simethicone Suspension 30 milliLiter(s) Oral every 4 hours PRN Dyspepsia  ondansetron Injectable 4 milliGRAM(s) IV Push every 6 hours PRN Nausea

## 2020-04-01 NOTE — PROGRESS NOTE ADULT - NSHPATTENDINGPLANDISCUSS_GEN_ALL_CORE
Patient, Sister Valentine and RN
Patient, Sister and RN

## 2020-04-02 LAB
ALBUMIN SERPL ELPH-MCNC: 3.2 G/DL — LOW (ref 3.3–5.2)
ALP SERPL-CCNC: 59 U/L — SIGNIFICANT CHANGE UP (ref 40–120)
ALT FLD-CCNC: 37 U/L — HIGH
ANION GAP SERPL CALC-SCNC: 15 MMOL/L — SIGNIFICANT CHANGE UP (ref 5–17)
AST SERPL-CCNC: 53 U/L — HIGH
BILIRUB SERPL-MCNC: 0.4 MG/DL — SIGNIFICANT CHANGE UP (ref 0.4–2)
BUN SERPL-MCNC: 8 MG/DL — SIGNIFICANT CHANGE UP (ref 8–20)
CALCIUM SERPL-MCNC: 9 MG/DL — SIGNIFICANT CHANGE UP (ref 8.6–10.2)
CHLORIDE SERPL-SCNC: 101 MMOL/L — SIGNIFICANT CHANGE UP (ref 98–107)
CO2 SERPL-SCNC: 25 MMOL/L — SIGNIFICANT CHANGE UP (ref 22–29)
CREAT SERPL-MCNC: 0.5 MG/DL — SIGNIFICANT CHANGE UP (ref 0.5–1.3)
CRP SERPL-MCNC: 2.89 MG/DL — HIGH (ref 0–0.4)
CULTURE RESULTS: SIGNIFICANT CHANGE UP
CULTURE RESULTS: SIGNIFICANT CHANGE UP
ERYTHROCYTE [SEDIMENTATION RATE] IN BLOOD: 43 MM/HR — HIGH (ref 0–20)
GLUCOSE SERPL-MCNC: 99 MG/DL — SIGNIFICANT CHANGE UP (ref 70–99)
HCT VFR BLD CALC: 41.7 % — SIGNIFICANT CHANGE UP (ref 34.5–45)
HGB BLD-MCNC: 12.7 G/DL — SIGNIFICANT CHANGE UP (ref 11.5–15.5)
MCHC RBC-ENTMCNC: 27.1 PG — SIGNIFICANT CHANGE UP (ref 27–34)
MCHC RBC-ENTMCNC: 30.5 GM/DL — LOW (ref 32–36)
MCV RBC AUTO: 89.1 FL — SIGNIFICANT CHANGE UP (ref 80–100)
PLATELET # BLD AUTO: 300 K/UL — SIGNIFICANT CHANGE UP (ref 150–400)
POTASSIUM SERPL-MCNC: 4 MMOL/L — SIGNIFICANT CHANGE UP (ref 3.5–5.3)
POTASSIUM SERPL-SCNC: 4 MMOL/L — SIGNIFICANT CHANGE UP (ref 3.5–5.3)
PROT SERPL-MCNC: 6.8 G/DL — SIGNIFICANT CHANGE UP (ref 6.6–8.7)
RBC # BLD: 4.68 M/UL — SIGNIFICANT CHANGE UP (ref 3.8–5.2)
RBC # FLD: 13.9 % — SIGNIFICANT CHANGE UP (ref 10.3–14.5)
SODIUM SERPL-SCNC: 141 MMOL/L — SIGNIFICANT CHANGE UP (ref 135–145)
SPECIMEN SOURCE: SIGNIFICANT CHANGE UP
SPECIMEN SOURCE: SIGNIFICANT CHANGE UP
WBC # BLD: 4.65 K/UL — SIGNIFICANT CHANGE UP (ref 3.8–10.5)
WBC # FLD AUTO: 4.65 K/UL — SIGNIFICANT CHANGE UP (ref 3.8–10.5)

## 2020-04-02 PROCEDURE — 99233 SBSQ HOSP IP/OBS HIGH 50: CPT | Mod: GC

## 2020-04-02 RX ORDER — THIAMINE MONONITRATE (VIT B1) 100 MG
200 TABLET ORAL DAILY
Refills: 0 | Status: DISCONTINUED | OUTPATIENT
Start: 2020-04-02 | End: 2020-04-13

## 2020-04-02 RX ORDER — ASCORBIC ACID 60 MG
1000 TABLET,CHEWABLE ORAL DAILY
Refills: 0 | Status: DISCONTINUED | OUTPATIENT
Start: 2020-04-02 | End: 2020-04-13

## 2020-04-02 RX ORDER — ALBUTEROL 90 UG/1
2 AEROSOL, METERED ORAL EVERY 6 HOURS
Refills: 0 | Status: DISCONTINUED | OUTPATIENT
Start: 2020-04-02 | End: 2020-04-13

## 2020-04-02 RX ADMIN — Medication 100 MILLIGRAM(S): at 04:49

## 2020-04-02 RX ADMIN — Medication 1000 MILLIGRAM(S): at 11:14

## 2020-04-02 RX ADMIN — Medication 137 MICROGRAM(S): at 04:49

## 2020-04-02 RX ADMIN — Medication 200 MILLIGRAM(S): at 11:19

## 2020-04-02 RX ADMIN — ENOXAPARIN SODIUM 40 MILLIGRAM(S): 100 INJECTION SUBCUTANEOUS at 11:14

## 2020-04-02 RX ADMIN — Medication 100 MILLIGRAM(S): at 11:19

## 2020-04-02 RX ADMIN — SERTRALINE 100 MILLIGRAM(S): 25 TABLET, FILM COATED ORAL at 11:14
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
## 2020-04-02 NOTE — PROGRESS NOTE ADULT - SUBJECTIVE AND OBJECTIVE BOX
Patient is a 49y old  Female who presents with a chief complaint of Hypoxia. Viral PNA. R/O COVID (01 Apr 2020 14:19)    Interval/Overnight Events:  Patient seen and examined at bedside. No acute overnight events. She reports feeling better this morning. Does not need O2 supplementation when at rest. However, she states that she gets a little short of breath when ambulating to the bathroom. Patient states she is okay with the team updating her sister via telephone.    ROS: Denies nausea, vomiting, chest pain, palpitations, abdominal pain, diarrhea, dysuria. All other ROS as per hpi.    Vital Signs Last 24 Hrs  T(C): 37.2 (02 Apr 2020 08:20), Max: 37.9 (01 Apr 2020 16:17)  T(F): 99 (02 Apr 2020 08:20), Max: 100.3 (01 Apr 2020 16:17)  HR: 83 (02 Apr 2020 08:20) (80 - 97)  BP: 132/80 (02 Apr 2020 08:20) (98/66 - 132/80)  RR: 18 (02 Apr 2020 08:20) (18 - 20)  SpO2: 94% (02 Apr 2020 08:20) (93% - 94%)      Physical Exam:  General: NAD, resting comfortably in bed, no O2 supplementation in place  Head: normocephalic, atraumatic  Eyes: EOMI  Pulm: unlabored breathing, good air entry, no crackles or wheezes  Cardio: S1S2+, RRR, no murmurs  GI: soft, non-distended, non-tender  Vascular: no peripheral edema, DP pulses 2+ b/l, no calf tenderness  MSK: FROM in all extremities  Neuro: alert and oriented, no gross focal deficits      Labs:                        12.6   3.91  )-----------( 283      ( 01 Apr 2020 17:46 )             40.5       04-01    139  |  100  |  7.0<L>  ----------------------------<  119<H>  3.5   |  25.0  |  0.46<L>    Ca    8.8      01 Apr 2020 17:46    TPro  6.5<L>  /  Alb  3.1<L>  /  TBili  0.3<L>  /  DBili  x   /  AST  40<H>  /  ALT  29  /  AlkPhos  55  04-01      MEDICATIONS  (STANDING):  ALBUTerol    90 MICROgram(s) HFA Inhaler 2 Puff(s) Inhalation every 6 hours  ascorbic acid 1000 milliGRAM(s) Oral daily  benzonatate 100 milliGRAM(s) Oral every 8 hours  enoxaparin Injectable 40 milliGRAM(s) SubCutaneous daily  levothyroxine 137 MICROGram(s) Oral daily  sertraline 100 milliGRAM(s) Oral daily  thiamine 200 milliGRAM(s) Oral daily    MEDICATIONS  (PRN):  acetaminophen   Tablet .. 650 milliGRAM(s) Oral every 6 hours PRN Temp greater or equal to 38C (100.4F), Mild Pain (1 - 3)  aluminum hydroxide/magnesium hydroxide/simethicone Suspension 30 milliLiter(s) Oral every 4 hours PRN Dyspepsia  ondansetron Injectable 4 milliGRAM(s) IV Push every 6 hours PRN Nausea

## 2020-04-02 NOTE — PROGRESS NOTE ADULT - PROBLEM SELECTOR PROBLEM 3
Traumatic brain injury with loss of consciousness, sequela

## 2020-04-03 PROCEDURE — 99233 SBSQ HOSP IP/OBS HIGH 50: CPT | Mod: GC

## 2020-04-03 RX ADMIN — ALBUTEROL 2 PUFF(S): 90 AEROSOL, METERED ORAL at 23:39

## 2020-04-03 RX ADMIN — SERTRALINE 100 MILLIGRAM(S): 25 TABLET, FILM COATED ORAL at 10:16

## 2020-04-03 RX ADMIN — Medication 137 MICROGRAM(S): at 06:44

## 2020-04-03 RX ADMIN — Medication 100 MILLIGRAM(S): at 13:10

## 2020-04-03 RX ADMIN — Medication 100 MILLIGRAM(S): at 00:24

## 2020-04-03 RX ADMIN — ALBUTEROL 2 PUFF(S): 90 AEROSOL, METERED ORAL at 06:45

## 2020-04-03 RX ADMIN — Medication 200 MILLIGRAM(S): at 10:16

## 2020-04-03 RX ADMIN — ENOXAPARIN SODIUM 40 MILLIGRAM(S): 100 INJECTION SUBCUTANEOUS at 23:26

## 2020-04-03 RX ADMIN — Medication 100 MILLIGRAM(S): at 06:44

## 2020-04-03 RX ADMIN — Medication 100 MILLIGRAM(S): at 23:25

## 2020-04-03 RX ADMIN — ALBUTEROL 2 PUFF(S): 90 AEROSOL, METERED ORAL at 08:00

## 2020-04-03 RX ADMIN — Medication 1000 MILLIGRAM(S): at 10:16
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
## 2020-04-03 NOTE — PROGRESS NOTE ADULT - SUBJECTIVE AND OBJECTIVE BOX
Patient is a 49y old  Female who presents with a chief complaint of Hypoxia. Viral PNA. R/O COVID (01 Apr 2020 14:19)    Interval/Overnight Events:  Patient seen and examined at bedside. No acute overnight events. Resting comfortably in bed. Does not feel sob but did require increased nasal cannula oxygen per nursing. Currently on 6LPM. Patient states she is okay with the team updating her sister via telephone.    ROS: Denies nausea, vomiting, chest pain, palpitations, abdominal pain, diarrhea, dysuria. All other ROS as per hpi.    Vital Signs Last 24 Hrs  T(C): 37 (03 Apr 2020 07:56), Max: 37.2 (02 Apr 2020 17:06)  T(F): 98.6 (03 Apr 2020 07:56), Max: 98.9 (02 Apr 2020 17:06)  HR: 90 (03 Apr 2020 07:56) (79 - 90)  BP: 120/76 (03 Apr 2020 07:56) (98/66 - 126/71)  RR: 20 (03 Apr 2020 07:56) (19 - 20)  SpO2: 92% (03 Apr 2020 07:56) (92% - 95%)    Physical Exam:  General: NAD, resting comfortably in bed, no O2 supplementation in place  Head: normocephalic, atraumatic  Eyes: EOMI  Pulm: unlabored breathing, good air entry, no crackles or wheezes  Cardio: S1S2+, RRR, no murmurs  GI: soft, non-distended, non-tender  Vascular: no peripheral edema, DP pulses 2+ b/l, no calf tenderness  MSK: FROM in all extremities  Neuro: alert and oriented, no gross focal deficits      Labs:                        12.6   3.91  )-----------( 283      ( 01 Apr 2020 17:46 )             40.5       04-01    139  |  100  |  7.0<L>  ----------------------------<  119<H>  3.5   |  25.0  |  0.46<L>    Ca    8.8      01 Apr 2020 17:46    TPro  6.5<L>  /  Alb  3.1<L>  /  TBili  0.3<L>  /  DBili  x   /  AST  40<H>  /  ALT  29  /  AlkPhos  55  04-01      MEDICATIONS  (STANDING):  ALBUTerol    90 MICROgram(s) HFA Inhaler 2 Puff(s) Inhalation every 6 hours  ascorbic acid 1000 milliGRAM(s) Oral daily  benzonatate 100 milliGRAM(s) Oral every 8 hours  enoxaparin Injectable 40 milliGRAM(s) SubCutaneous daily  levothyroxine 137 MICROGram(s) Oral daily  sertraline 100 milliGRAM(s) Oral daily  thiamine 200 milliGRAM(s) Oral daily    MEDICATIONS  (PRN):  acetaminophen   Tablet .. 650 milliGRAM(s) Oral every 6 hours PRN Temp greater or equal to 38C (100.4F), Mild Pain (1 - 3)  aluminum hydroxide/magnesium hydroxide/simethicone Suspension 30 milliLiter(s) Oral every 4 hours PRN Dyspepsia  ondansetron Injectable 4 milliGRAM(s) IV Push every 6 hours PRN Nausea

## 2020-04-04 PROCEDURE — 99233 SBSQ HOSP IP/OBS HIGH 50: CPT | Mod: GC

## 2020-04-04 RX ADMIN — ENOXAPARIN SODIUM 40 MILLIGRAM(S): 100 INJECTION SUBCUTANEOUS at 11:00

## 2020-04-04 RX ADMIN — Medication 1000 MILLIGRAM(S): at 11:00

## 2020-04-04 RX ADMIN — SERTRALINE 100 MILLIGRAM(S): 25 TABLET, FILM COATED ORAL at 11:00

## 2020-04-04 RX ADMIN — Medication 137 MICROGRAM(S): at 06:19

## 2020-04-04 RX ADMIN — Medication 100 MILLIGRAM(S): at 11:54

## 2020-04-04 RX ADMIN — Medication 100 MILLIGRAM(S): at 21:54

## 2020-04-04 RX ADMIN — ALBUTEROL 2 PUFF(S): 90 AEROSOL, METERED ORAL at 06:19

## 2020-04-04 RX ADMIN — Medication 100 MILLIGRAM(S): at 06:19

## 2020-04-04 RX ADMIN — Medication 200 MILLIGRAM(S): at 11:01

## 2020-04-04 RX ADMIN — ALBUTEROL 2 PUFF(S): 90 AEROSOL, METERED ORAL at 21:55
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
## 2020-04-04 NOTE — PROGRESS NOTE ADULT - SUBJECTIVE AND OBJECTIVE BOX
Patient is a 49y old  Female who presents with a chief complaint of Hypoxia. Viral PNA. R/O COVID (01 Apr 2020 14:19)    Interval/Overnight Events:  Patient seen and examined at bedside. No acute overnight events. Resting comfortably in bed. States she feels well and sob resolved but does still require nasal cannula oxygen. desat to 86 when put on room air. Currently on 4LPM. Patient states she is okay with the team updating her sister via telephone.    ROS: Denies nausea, vomiting, chest pain, palpitations, abdominal pain, diarrhea, dysuria. All other ROS as per hpi.    Vital Signs Last 24 Hrs  T(C): 36.9 (04 Apr 2020 14:45), Max: 37.1 (03 Apr 2020 16:39)  T(F): 98.5 (04 Apr 2020 14:45), Max: 98.7 (03 Apr 2020 16:39)  HR: 86 (04 Apr 2020 14:45) (58 - 91)  BP: 118/71 (04 Apr 2020 14:45) (102/59 - 128/81)  RR: 19 (04 Apr 2020 14:45) (19 - 20)  SpO2: 95% (04 Apr 2020 14:45) (93% - 95%)    Physical Exam:  General: NAD, resting comfortably in bed, no O2 supplementation in place  Head: normocephalic, atraumatic  Eyes: EOMI  Pulm: unlabored breathing, good air entry, no crackles or wheezes  Cardio: S1S2+, RRR, no murmurs  GI: soft, non-distended, non-tender  Vascular: no peripheral edema, DP pulses 2+ b/l, no calf tenderness  MSK: FROM in all extremities  Neuro: alert and oriented, no gross focal deficits      Labs:                        12.6   3.91  )-----------( 283      ( 01 Apr 2020 17:46 )             40.5       04-01    139  |  100  |  7.0<L>  ----------------------------<  119<H>  3.5   |  25.0  |  0.46<L>    Ca    8.8      01 Apr 2020 17:46    TPro  6.5<L>  /  Alb  3.1<L>  /  TBili  0.3<L>  /  DBili  x   /  AST  40<H>  /  ALT  29  /  AlkPhos  55  04-01      MEDICATIONS  (STANDING):  ALBUTerol    90 MICROgram(s) HFA Inhaler 2 Puff(s) Inhalation every 6 hours  ascorbic acid 1000 milliGRAM(s) Oral daily  benzonatate 100 milliGRAM(s) Oral every 8 hours  enoxaparin Injectable 40 milliGRAM(s) SubCutaneous daily  levothyroxine 137 MICROGram(s) Oral daily  sertraline 100 milliGRAM(s) Oral daily  thiamine 200 milliGRAM(s) Oral daily    MEDICATIONS  (PRN):  acetaminophen   Tablet .. 650 milliGRAM(s) Oral every 6 hours PRN Temp greater or equal to 38C (100.4F), Mild Pain (1 - 3)  aluminum hydroxide/magnesium hydroxide/simethicone Suspension 30 milliLiter(s) Oral every 4 hours PRN Dyspepsia  ondansetron Injectable 4 milliGRAM(s) IV Push every 6 hours PRN Nausea

## 2020-04-05 PROCEDURE — 99233 SBSQ HOSP IP/OBS HIGH 50: CPT | Mod: GC

## 2020-04-05 RX ADMIN — ENOXAPARIN SODIUM 40 MILLIGRAM(S): 100 INJECTION SUBCUTANEOUS at 13:15

## 2020-04-05 RX ADMIN — Medication 100 MILLIGRAM(S): at 13:16

## 2020-04-05 RX ADMIN — SERTRALINE 100 MILLIGRAM(S): 25 TABLET, FILM COATED ORAL at 13:16

## 2020-04-05 RX ADMIN — Medication 100 MILLIGRAM(S): at 23:05

## 2020-04-05 RX ADMIN — ALBUTEROL 2 PUFF(S): 90 AEROSOL, METERED ORAL at 23:05

## 2020-04-05 RX ADMIN — Medication 1000 MILLIGRAM(S): at 13:16

## 2020-04-05 RX ADMIN — Medication 200 MILLIGRAM(S): at 13:16

## 2020-04-05 RX ADMIN — Medication 137 MICROGRAM(S): at 06:03

## 2020-04-05 RX ADMIN — Medication 100 MILLIGRAM(S): at 06:03

## 2020-04-05 NOTE — PROGRESS NOTE ADULT - SUBJECTIVE AND OBJECTIVE BOX
Patient is a 49y old  Female who presents with a chief complaint of Hypoxia. Viral PNA. R/O COVID (01 Apr 2020 14:19)    Interval/Overnight Events:  Patient seen and examined at bedside. No acute overnight events. Resting comfortably in bed. States she feels and anxious to be discharged. Currently still requiring 3L nasal cannula. Agrees to continue titrating down. Patient states she is okay with the team updating her sister via telephone.    ROS: Denies nausea, vomiting, chest pain, palpitations, abdominal pain, diarrhea, dysuria. All other ROS as per hpi.    Vital Signs Last 24 Hrs  T(C): 36.9 (05 Apr 2020 09:16), Max: 37.1 (05 Apr 2020 05:00)  T(F): 98.4 (05 Apr 2020 09:16), Max: 98.7 (05 Apr 2020 05:00)  HR: 82 (05 Apr 2020 09:16) (69 - 86)  BP: 106/67 (05 Apr 2020 09:16) (106/67 - 131/83)  RR: 20 (05 Apr 2020 09:16) (19 - 20)  SpO2: 90% (05 Apr 2020 09:16) (90% - 95%)    Physical Exam:  General: NAD, resting comfortably in bed, no O2 supplementation in place  Head: normocephalic, atraumatic  Eyes: EOMI  Pulm: unlabored breathing, good air entry, no crackles or wheezes  Cardio: S1S2+, RRR, no murmurs  GI: soft, non-distended, non-tender  Vascular: no peripheral edema, DP pulses 2+ b/l, no calf tenderness  MSK: FROM in all extremities  Neuro: alert and oriented, no gross focal deficits      Labs:                        12.6   3.91  )-----------( 283      ( 01 Apr 2020 17:46 )             40.5       04-01    139  |  100  |  7.0<L>  ----------------------------<  119<H>  3.5   |  25.0  |  0.46<L>    Ca    8.8      01 Apr 2020 17:46    TPro  6.5<L>  /  Alb  3.1<L>  /  TBili  0.3<L>  /  DBili  x   /  AST  40<H>  /  ALT  29  /  AlkPhos  55  04-01    MEDICATIONS  (STANDING):  ALBUTerol    90 MICROgram(s) HFA Inhaler 2 Puff(s) Inhalation every 6 hours  ascorbic acid 1000 milliGRAM(s) Oral daily  benzonatate 100 milliGRAM(s) Oral every 8 hours  enoxaparin Injectable 40 milliGRAM(s) SubCutaneous daily  levothyroxine 137 MICROGram(s) Oral daily  sertraline 100 milliGRAM(s) Oral daily  thiamine 200 milliGRAM(s) Oral daily    MEDICATIONS  (PRN):  acetaminophen   Tablet .. 650 milliGRAM(s) Oral every 6 hours PRN Temp greater or equal to 38C (100.4F), Mild Pain (1 - 3)  aluminum hydroxide/magnesium hydroxide/simethicone Suspension 30 milliLiter(s) Oral every 4 hours PRN Dyspepsia  ondansetron Injectable 4 milliGRAM(s) IV Push every 6 hours PRN Nausea

## 2020-04-05 NOTE — DIETITIAN INITIAL EVALUATION ADULT. - PERTINENT MEDS FT
MEDICATIONS  (STANDING):  ALBUTerol    90 MICROgram(s) HFA Inhaler 2 Puff(s) Inhalation every 6 hours  ascorbic acid 1000 milliGRAM(s) Oral daily  benzonatate 100 milliGRAM(s) Oral every 8 hours  enoxaparin Injectable 40 milliGRAM(s) SubCutaneous daily  levothyroxine 137 MICROGram(s) Oral daily  sertraline 100 milliGRAM(s) Oral daily  thiamine 200 milliGRAM(s) Oral daily    MEDICATIONS  (PRN):  acetaminophen   Tablet .. 650 milliGRAM(s) Oral every 6 hours PRN Temp greater or equal to 38C (100.4F), Mild Pain (1 - 3)  aluminum hydroxide/magnesium hydroxide/simethicone Suspension 30 milliLiter(s) Oral every 4 hours PRN Dyspepsia  ondansetron Injectable 4 milliGRAM(s) IV Push every 6 hours PRN Nausea
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
## 2020-04-06 PROCEDURE — 99232 SBSQ HOSP IP/OBS MODERATE 35: CPT

## 2020-04-06 RX ADMIN — Medication 100 MILLIGRAM(S): at 06:08

## 2020-04-06 RX ADMIN — ENOXAPARIN SODIUM 40 MILLIGRAM(S): 100 INJECTION SUBCUTANEOUS at 11:56

## 2020-04-06 RX ADMIN — Medication 100 MILLIGRAM(S): at 22:16

## 2020-04-06 RX ADMIN — Medication 1000 MILLIGRAM(S): at 11:56

## 2020-04-06 RX ADMIN — ALBUTEROL 2 PUFF(S): 90 AEROSOL, METERED ORAL at 06:10

## 2020-04-06 RX ADMIN — Medication 100 MILLIGRAM(S): at 11:56

## 2020-04-06 RX ADMIN — SERTRALINE 100 MILLIGRAM(S): 25 TABLET, FILM COATED ORAL at 11:57

## 2020-04-06 RX ADMIN — Medication 200 MILLIGRAM(S): at 11:57

## 2020-04-06 RX ADMIN — ALBUTEROL 2 PUFF(S): 90 AEROSOL, METERED ORAL at 22:16

## 2020-04-06 RX ADMIN — Medication 137 MICROGRAM(S): at 06:08
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
## 2020-04-06 NOTE — PROGRESS NOTE ADULT - SUBJECTIVE AND OBJECTIVE BOX
Patient is a 49y old  Female who presents with a chief complaint of Hypoxia. Viral PNA. R/O COVID (01 Apr 2020 14:19)    Interval/Overnight Events:  Patient seen and examined at bedside. No acute overnight events. Resting comfortably and notices her breathing has improved. Currently still requiring 2L nasal cannula. Agrees to continue titrating down. Patient states she is okay with the team updating her sister via telephone.    ROS: Denies nausea, vomiting, chest pain, palpitations, abdominal pain, diarrhea, dysuria. All other ROS as per hpi.    Vital Signs Last 24 Hrs  T(C): 36.7 (06 Apr 2020 09:00), Max: 36.8 (05 Apr 2020 17:27)  T(F): 98 (06 Apr 2020 09:00), Max: 98.3 (05 Apr 2020 23:07)  HR: 81 (06 Apr 2020 09:00) (79 - 92)  BP: 114/76 (06 Apr 2020 09:00) (102/67 - 114/76)  RR: 18 (06 Apr 2020 09:00) (18 - 20)  SpO2: 91% (06 Apr 2020 09:00) (91% - 97%)    Physical Exam:  General: NAD, resting comfortably in bed, no O2 supplementation in place  Head: normocephalic, atraumatic  Eyes: EOMI  Pulm: unlabored breathing, good air entry, no crackles or wheezes  Cardio: S1S2+, RRR, no murmurs  GI: soft, non-distended, non-tender  Vascular: no peripheral edema, DP pulses 2+ b/l, no calf tenderness  MSK: FROM in all extremities  Neuro: alert and oriented, no gross focal deficits      Labs:                        12.6   3.91  )-----------( 283      ( 01 Apr 2020 17:46 )             40.5     04-01    139  |  100  |  7.0<L>  ----------------------------<  119<H>  3.5   |  25.0  |  0.46<L>    Ca    8.8      01 Apr 2020 17:46    TPro  6.5<L>  /  Alb  3.1<L>  /  TBili  0.3<L>  /  DBili  x   /  AST  40<H>  /  ALT  29  /  AlkPhos  55  04-01    MEDICATIONS  (STANDING):  ALBUTerol    90 MICROgram(s) HFA Inhaler 2 Puff(s) Inhalation every 6 hours  ascorbic acid 1000 milliGRAM(s) Oral daily  benzonatate 100 milliGRAM(s) Oral every 8 hours  enoxaparin Injectable 40 milliGRAM(s) SubCutaneous daily  levothyroxine 137 MICROGram(s) Oral daily  sertraline 100 milliGRAM(s) Oral daily  thiamine 200 milliGRAM(s) Oral daily    MEDICATIONS  (PRN):  acetaminophen   Tablet .. 650 milliGRAM(s) Oral every 6 hours PRN Temp greater or equal to 38C (100.4F), Mild Pain (1 - 3)  aluminum hydroxide/magnesium hydroxide/simethicone Suspension 30 milliLiter(s) Oral every 4 hours PRN Dyspepsia  ondansetron Injectable 4 milliGRAM(s) IV Push every 6 hours PRN Nausea

## 2020-04-07 ENCOUNTER — TRANSCRIPTION ENCOUNTER (OUTPATIENT)
Age: 50
End: 2020-04-07

## 2020-04-07 LAB
ALBUMIN SERPL ELPH-MCNC: 3.5 G/DL — SIGNIFICANT CHANGE UP (ref 3.3–5.2)
ALP SERPL-CCNC: 64 U/L — SIGNIFICANT CHANGE UP (ref 40–120)
ALT FLD-CCNC: 30 U/L — SIGNIFICANT CHANGE UP
ANION GAP SERPL CALC-SCNC: 16 MMOL/L — SIGNIFICANT CHANGE UP (ref 5–17)
AST SERPL-CCNC: 32 U/L — HIGH
BILIRUB SERPL-MCNC: 0.4 MG/DL — SIGNIFICANT CHANGE UP (ref 0.4–2)
BUN SERPL-MCNC: 14 MG/DL — SIGNIFICANT CHANGE UP (ref 8–20)
CALCIUM SERPL-MCNC: 9 MG/DL — SIGNIFICANT CHANGE UP (ref 8.6–10.2)
CHLORIDE SERPL-SCNC: 101 MMOL/L — SIGNIFICANT CHANGE UP (ref 98–107)
CO2 SERPL-SCNC: 23 MMOL/L — SIGNIFICANT CHANGE UP (ref 22–29)
CREAT SERPL-MCNC: 0.47 MG/DL — LOW (ref 0.5–1.3)
GLUCOSE SERPL-MCNC: 86 MG/DL — SIGNIFICANT CHANGE UP (ref 70–99)
POTASSIUM SERPL-MCNC: 5 MMOL/L — SIGNIFICANT CHANGE UP (ref 3.5–5.3)
POTASSIUM SERPL-SCNC: 5 MMOL/L — SIGNIFICANT CHANGE UP (ref 3.5–5.3)
PROT SERPL-MCNC: 7.2 G/DL — SIGNIFICANT CHANGE UP (ref 6.6–8.7)
SODIUM SERPL-SCNC: 140 MMOL/L — SIGNIFICANT CHANGE UP (ref 135–145)

## 2020-04-07 PROCEDURE — 99232 SBSQ HOSP IP/OBS MODERATE 35: CPT

## 2020-04-07 RX ORDER — NABUMETONE 750 MG
1 TABLET ORAL
Qty: 0 | Refills: 0 | DISCHARGE

## 2020-04-07 RX ORDER — SERTRALINE 25 MG/1
1 TABLET, FILM COATED ORAL
Qty: 0 | Refills: 0 | DISCHARGE

## 2020-04-07 RX ORDER — LEVOTHYROXINE SODIUM 125 MCG
1 TABLET ORAL
Qty: 0 | Refills: 0 | DISCHARGE

## 2020-04-07 RX ADMIN — ALBUTEROL 2 PUFF(S): 90 AEROSOL, METERED ORAL at 13:43

## 2020-04-07 RX ADMIN — ALBUTEROL 2 PUFF(S): 90 AEROSOL, METERED ORAL at 05:45

## 2020-04-07 RX ADMIN — Medication 100 MILLIGRAM(S): at 05:45

## 2020-04-07 RX ADMIN — ENOXAPARIN SODIUM 40 MILLIGRAM(S): 100 INJECTION SUBCUTANEOUS at 13:44

## 2020-04-07 RX ADMIN — ALBUTEROL 2 PUFF(S): 90 AEROSOL, METERED ORAL at 22:24

## 2020-04-07 RX ADMIN — Medication 137 MICROGRAM(S): at 05:45

## 2020-04-07 RX ADMIN — SERTRALINE 100 MILLIGRAM(S): 25 TABLET, FILM COATED ORAL at 13:44

## 2020-04-07 RX ADMIN — Medication 1000 MILLIGRAM(S): at 13:44

## 2020-04-07 RX ADMIN — Medication 100 MILLIGRAM(S): at 22:24

## 2020-04-07 RX ADMIN — Medication 100 MILLIGRAM(S): at 13:44

## 2020-04-07 RX ADMIN — Medication 200 MILLIGRAM(S): at 13:44
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
## 2020-04-07 NOTE — DISCHARGE NOTE PROVIDER - CARE PROVIDER_API CALL
Your Primary Care Physician,   1-2 days of discharge  Phone: (   )    -  Fax: (   )    -  Follow Up Time:

## 2020-04-07 NOTE — PROGRESS NOTE ADULT - SUBJECTIVE AND OBJECTIVE BOX
Patient is a 49y old  Female who presents with a chief complaint of Hypoxia. Viral PNA. R/O COVID (01 Apr 2020 14:19)    Interval/Overnight Events:  Patient seen and examined at bedside. No acute overnight events. Currently still requiring 2L nasal cannula. Agrees to continue titrating down. Pt continues with mild dyspnea while moving. Desats to 89% at 1 lt NC    ROS: Denies nausea, vomiting, chest pain, palpitations, abdominal pain, diarrhea, dysuria. All other ROS as per hpi.    Vital Signs Last 24 Hrs  T(C): 36.8 (07 Apr 2020 09:50), Max: 36.8 (06 Apr 2020 18:27)  T(F): 98.3 (07 Apr 2020 09:50), Max: 98.3 (07 Apr 2020 09:50)  HR: 86 (07 Apr 2020 09:50) (77 - 94)  BP: 91/66 (07 Apr 2020 09:50) (91/66 - 117/74)  RR: 18 (07 Apr 2020 09:50) (18 - 20)  SpO2: 95% (07 Apr 2020 09:50) (92% - 95%)    Physical Exam:  General: Obese female, NAD, resting comfortably in bed, no O2 supplementation in place  Head: normocephalic, atraumatic  Eyes: EOMI  Pulm: unlabored breathing, good air entry, Mild rhonchi in mid lobes bilaterally, no crackles or wheezes  Cardio: S1S2+, RRR, no murmurs  GI: soft, non-distended, non-tender, non guarding  Vascular: no peripheral edema, DP pulses 2+ b/l, no calf tenderness  MSK: FROM in all extremities  Neuro: alert and oriented, no gross focal deficits    Labs:             04-07  140  |  101  |  14.0  ----------------------------<  86  5.0   |  23.0  |  0.47<L>    Ca    9.0      07 Apr 2020 07:02    TPro  7.2  /  Alb  3.5  /  TBili  0.4  /  DBili  x   /  AST  32<H>  /  ALT  30  /  AlkPhos  64  04-07

## 2020-04-07 NOTE — DISCHARGE NOTE PROVIDER - NSDCCPCAREPLAN_GEN_ALL_CORE_FT
PRINCIPAL DISCHARGE DIAGNOSIS  Diagnosis: Pneumonia due to 2019 novel coronavirus  Assessment and Plan of Treatment: -You were infected with Coronavirus (COVID-19). It has been determined you no longer need hospitalization and can recover while remaining in self-quarantine at home for the next 14 days.  -Please continue with self-proning, laying faceside down for at least 15 minutes every hour or 2 hours twice a day. Ideally more to improve your breathing.   -You should restrict activities outside your home, except for getting medical care.   -Do not go to work, school, or public areas.   -Avoid using public transportation, ride sharing, or taxis  -Separate yourself from other people and animals in your home  -Call ahead before visiting your doctor to take steps to keep other people from getting infected  -Wear a facemask  -Cover your coughs and sneezes  -Clean your hands often for at least 20 seconds with soap and water or rubbing alcohol until dry  -Avoid sharing your personal household items  -Clean all "high-touch" surfaces everyday.   -Monitor your symptoms, please call your healthcare provider or go to an Emergency room if you experience worsening fever, chills, malaise, cough, shortness of breath      SECONDARY DISCHARGE DIAGNOSES  Diagnosis: Acute respiratory failure with hypoxia  Assessment and Plan of Treatment: You required free oxygen for support due to your pneumonia.    Diagnosis: Essential hypertension  Assessment and Plan of Treatment: Be sure to follow a low salt diet. Please continue your medications as indicated. The medications do not work if they are not taken consistently. Follow up with your Primary Care Doctor and have your Blood Pressure checked.    Diagnosis: Hypothyroidism, unspecified type  Assessment and Plan of Treatment: You have hypothyroidism. For this condition, it is important to continue taking medication as prescribed. Be sure to have your Primary Care Doctor or Endocrinologist repeat blood work to check your Thyroid function in 6 weeks to make sure that your current dose does not need to be changed again to reach the best possible level for you. PRINCIPAL DISCHARGE DIAGNOSIS  Diagnosis: Pneumonia due to 2019 novel coronavirus  Assessment and Plan of Treatment: -You were infected with Coronavirus (COVID-19). It has been determined you no longer need hospitalization and can recover while remaining in self-quarantine at home for the next 14 days.  -Please continue with self-proning, laying faceside down for at least 15 minutes every hour or 2 hours twice a day. Ideally more to improve your breathing.   -You should restrict activities outside your home, except for getting medical care.   -Do not go to work, school, or public areas.   -Avoid using public transportation, ride sharing, or taxis  -Separate yourself from other people and animals in your home  -Call ahead before visiting your doctor to take steps to keep other people from getting infected  -Wear a facemask  -Cover your coughs and sneezes  -Clean your hands often for at least 20 seconds with soap and water or rubbing alcohol until dry  -Avoid sharing your personal household items  -Clean all "high-touch" surfaces everyday.   -Monitor your symptoms, please call your healthcare provider or go to an Emergency room if you experience worsening fever, chills, malaise, cough, shortness of breath  On discharge, you requiring 2 liters oxygen via nasal cannula at rest and ambulation to maintain oxygenation of >92%. Pt requires home O2 due to their acute hypoxic respiratory failure secondary to COVID pneumonia in order to increase hospital capacity and mitigate risk of additional spread.      SECONDARY DISCHARGE DIAGNOSES  Diagnosis: Acute respiratory failure with hypoxia  Assessment and Plan of Treatment: You required free oxygen for support due to your pneumonia.    Diagnosis: Essential hypertension  Assessment and Plan of Treatment: Be sure to follow a low salt diet. Please continue your medications as indicated. The medications do not work if they are not taken consistently. Follow up with your Primary Care Doctor and have your Blood Pressure checked.    Diagnosis: Hypothyroidism, unspecified type  Assessment and Plan of Treatment: You have hypothyroidism. For this condition, it is important to continue taking medication as prescribed. Be sure to have your Primary Care Doctor or Endocrinologist repeat blood work to check your Thyroid function in 6 weeks to make sure that your current dose does not need to be changed again to reach the best possible level for you. PRINCIPAL DISCHARGE DIAGNOSIS  Diagnosis: Pneumonia due to 2019 novel coronavirus  Assessment and Plan of Treatment: -You were infected with Coronavirus (COVID-19). It has been determined you no longer need hospitalization and can recover while remaining in self-quarantine at home for the next 14 days.  -Please continue with self-proning, laying faceside down for at least 15 minutes every hour or 2 hours twice a day. Ideally more to improve your breathing.   -You should restrict activities outside your home, except for getting medical care.   -Do not go to work, school, or public areas.   -Avoid using public transportation, ride sharing, or taxis  -Separate yourself from other people and animals in your home  -Call ahead before visiting your doctor to take steps to keep other people from getting infected  -Wear a facemask  -Cover your coughs and sneezes  -Clean your hands often for at least 20 seconds with soap and water or rubbing alcohol until dry  -Avoid sharing your personal household items  -Clean all "high-touch" surfaces everyday.   -Monitor your symptoms, please call your healthcare provider or go to an Emergency room if you experience worsening fever, chills, malaise, cough, shortness of breath  On discharge, you were requiring 2 liters of oxygen via nasal cannula at rest and ambulation to maintain oxygenation of >92%. Please continue to take your oxygen at 2 liters per minute at rest and while ambulating. Please follow up with your primary care physician in 1-2 days of discharge for further recommendations and adjustments to your oxygen regimen.         SECONDARY DISCHARGE DIAGNOSES  Diagnosis: Essential hypertension  Assessment and Plan of Treatment: Be sure to follow a low salt diet. Please continue your medications as indicated. The medications do not work if they are not taken consistently. Follow up with your Primary Care Doctor and have your Blood Pressure checked.    Diagnosis: Hypothyroidism, unspecified type  Assessment and Plan of Treatment: You have hypothyroidism. For this condition, it is important to continue taking medication as prescribed. Be sure to have your Primary Care Doctor or Endocrinologist repeat blood work to check your Thyroid function in 6 weeks to make sure that your current dose does not need to be changed again to reach the best possible level for you.

## 2020-04-07 NOTE — DISCHARGE NOTE PROVIDER - NSDCMRMEDTOKEN_GEN_ALL_CORE_FT
levothyroxine 137 mcg (0.137 mg) oral tablet: 1 tab(s) orally once a day  nabumetone 500 mg oral tablet: 1 tab(s) orally 2 times a day  Zoloft 100 mg oral tablet: 1 tab(s) orally once a day acetaminophen 325 mg oral tablet: 2 tab(s) orally every 6 hours, As needed, Temp greater or equal to 38C (100.4F), Mild Pain (1 - 3)  levothyroxine 137 mcg (0.137 mg) oral tablet: 1 tab(s) orally once a day  nabumetone 500 mg oral tablet: 1 tab(s) orally 2 times a day  Zoloft 100 mg oral tablet: 1 tab(s) orally once a day

## 2020-04-07 NOTE — DISCHARGE NOTE PROVIDER - NSDCFUADDAPPT_GEN_ALL_CORE_FT
Please continue to take all your medications as prescribed.     Please continue to take supplimental oxygen at 2 liters per minute during rest and ambulation to keep your oxygen saturation above 92%. Follow up with your PCP in 1-2 days of discharge for further recommendations on continuing oxygen.    Please return to the ER immediately if your symptoms worsen or if you have shortness of breath. Please continue to take all your medications as prescribed.     Please continue to take supplemental oxygen at 2 liters per minute during rest and ambulation to keep your oxygen saturation above 92%. Follow up with your PCP in 1-2 days of discharge for further recommendations on continuing oxygen.    Please return to the ER immediately if your symptoms worsen or if you have shortness of breath.

## 2020-04-07 NOTE — DISCHARGE NOTE PROVIDER - INSTRUCTIONS
2000 calories a day of: Whole grains (6 to 8 servings a day), Vegetables (4 to 5 servings a day, Fruits (4 to 5 servings a day), Low-fat or fat-free milk and milk products (2 to 3 servings a day), Lean meat, poultry, and fish (6 or fewer servings a day), Nuts, seeds, and beans (4 to 5 servings a week), Healthy fats and oils (2 to 3 servings a day), Sweets, preferably low-fat or fat-free (5 or fewer a week), Sodium (no more than 2,300 mg a day). To reduce your blood pressure even more, replace some DASH diet carbohydrates with low-fat protein and unsaturated fats.  Please do not drink alcohol to help reduce your blood pressure even more. For weight loss, reduce your daily calories to 1,600 per day.

## 2020-04-07 NOTE — DISCHARGE NOTE PROVIDER - HOSPITAL COURSE
48 y/o F with PMHx of old TBI, Hypothyroidism, DVT s/p IVC filter who resides at home with sister who helps take care of her. Patient c/o increasing dry cough, diffuse body aches, and fever for 1 week. Patient sister is COVID pos. She was taken UC and found to be febrile and hypoxic. She was then sent to [facility] ED. In the ED she was found to have hypoxia w/ RA sat of 90, fever of 103, b/l interstitial infiltrates. She was given IVF, Zithromax, oxygen, and albuterol in ED. Admitted due to hypoxic respiratory failure 2/2 viral pneumonia (COVID-19+). Pt was started on high dose vitamin C and thiamine. Proning positioning. Slow clinical course due to desaturations awhile ambulating.... 50 y/o F with PMHx of old TBI, Hypothyroidism, DVT s/p IVC filter who resides at home with sister who helps take care of her. Patient c/o increasing dry cough, diffuse body aches, and fever for 1 week. Patient sister is COVID pos. She was taken UC and found to be febrile and hypoxic. She was then sent to [facility] ED. In the ED she was found to have hypoxia w/ RA sat of 90, fever of 103, b/l interstitial infiltrates. Pt admitted for acute hypoxic resp failure 2/2 covid.  She was given IVF, Zithromax, oxygen, and albuterol in ED.  Pt was started on high dose vitamin C and thiamine. Incentive spirometer and prone positioning with slow clinical improvement. Now saturating well on 2L nasal cannula at rest and on ambulation.     All electrolyte abnormalities were monitored carefully and repleted as necessary during this hospitalization. At the time of discharge patient was hemodynamically stable and amenable to all terms of discharge. The patient has received verbal instructions from myself regarding discharge plans.         Length of Discharge: 45MIN        Vital Signs Last 24 Hrs    T(C): 36.6 (08 Apr 2020 08:19), Max: 36.9 (07 Apr 2020 16:50)    T(F): 97.9 (08 Apr 2020 08:19), Max: 98.4 (07 Apr 2020 16:50)    HR: 95 (08 Apr 2020 08:19) (79 - 95)    BP: 121/80 (08 Apr 2020 08:19) (101/66 - 121/80)    RR: 18 (08 Apr 2020 08:19) (18 - 18)    SpO2: 92% (08 Apr 2020 08:19) (92% - 92%)        Physical Exam:    General: Obese female, NAD, resting comfortably in bed, no O2 supplementation in place    Head: normocephalic, atraumatic    Eyes: EOMI    Pulm: unlabored breathing, good air entry, Mild rhonchi in mid lobes bilaterally, no crackles or wheezes    Cardio: S1S2+, RRR, no murmurs    GI: soft, non-distended, non-tender, non guarding    Vascular: no peripheral edema, DP pulses 2+ b/l, no calf tenderness    MSK: FROM in all extremities    Neuro: alert and oriented, no gross focal deficits 48 y/o F with PMHx of old TBI, Hypothyroidism, DVT s/p IVC filter who resides at home with sister who helps take care of her. Patient c/o increasing dry cough, diffuse body aches, and fever for 1 week. Patient sister is COVID pos. She was taken UC and found to be febrile and hypoxic. She was then sent to [facility] ED. In the ED she was found to have hypoxia w/ RA sat of 90, fever of 103, b/l interstitial infiltrates. Pt admitted for acute hypoxic resp failure 2/2 covid.  She was given IVF, Zithromax, oxygen, and albuterol in ED.  Pt was started on high dose vitamin C and thiamine. Incentive spirometer and prone positioning with slow clinical improvement. Now saturating well on 2L nasal cannula at rest and on ambulation.         Pt requiring 2L nasal cannula at rest and ambulation to maintain oxygenation of >92%. Pt requires home O2 due to their acute hypoxic respiratory failure secondary to COVID pneumonia in order to increase hospital capacity and mitigate risk of additional spread.        All electrolyte abnormalities were monitored carefully and repleted as necessary during this hospitalization. At the time of discharge patient was hemodynamically stable and amenable to all terms of discharge. The patient has received verbal instructions from myself regarding discharge plans.         Length of Discharge: 45MIN        Vital Signs Last 24 Hrs    T(C): 36.6 (08 Apr 2020 08:19), Max: 36.9 (07 Apr 2020 16:50)    T(F): 97.9 (08 Apr 2020 08:19), Max: 98.4 (07 Apr 2020 16:50)    HR: 95 (08 Apr 2020 08:19) (79 - 95)    BP: 121/80 (08 Apr 2020 08:19) (101/66 - 121/80)    RR: 18 (08 Apr 2020 08:19) (18 - 18)    SpO2: 92% (08 Apr 2020 08:19) (92% - 92%)        Physical Exam:    General: Obese female, NAD, resting comfortably in bed, no O2 supplementation in place    Head: normocephalic, atraumatic    Eyes: EOMI    Pulm: unlabored breathing, good air entry, Mild rhonchi in mid lobes bilaterally, no crackles or wheezes    Cardio: S1S2+, RRR, no murmurs    GI: soft, non-distended, non-tender, non guarding    Vascular: no peripheral edema, DP pulses 2+ b/l, no calf tenderness    MSK: FROM in all extremities    Neuro: alert and oriented, no gross focal deficits 48 y/o F with PMHx of old TBI, Hypothyroidism, DVT s/p IVC filter who resides at home with sister who helps take care of her. Patient c/o increasing dry cough, diffuse body aches, and fever for 1 week. Patient sister is COVID pos. She was taken UC and found to be febrile and hypoxic. She was then sent to [facility] ED. In the ED she was found to have hypoxia w/ RA sat of 90, fever of 103, b/l interstitial infiltrates. Pt admitted for acute hypoxic resp failure 2/2 covid.  She was given IVF, Zithromax, oxygen, and albuterol in ED.  Pt was started on high dose vitamin C and thiamine. Incentive spirometer and prone positioning with slow clinical improvement. Now saturating well on 2L nasal cannula at rest and on ambulation.         Pt requiring 2L nasal cannula at rest and ambulation to maintain oxygenation of >92%. Pt requires home O2 due to their acute hypoxic respiratory failure secondary to COVID pneumonia in order to increase hospital capacity and mitigate risk of additional spread.        All electrolyte abnormalities were monitored carefully and repleted as necessary during this hospitalization. At the time of discharge patient was hemodynamically stable and amenable to all terms of discharge. The patient has received verbal instructions from myself regarding discharge plans.         Length of Discharge: 45MIN        Vital Signs Last 24 Hrs    T(C): 36.6 (08 Apr 2020 08:19), Max: 36.9 (07 Apr 2020 16:50)    T(F): 97.9 (08 Apr 2020 08:19), Max: 98.4 (07 Apr 2020 16:50)    HR: 95 (08 Apr 2020 08:19) (79 - 95)    BP: 121/80 (08 Apr 2020 08:19) (101/66 - 121/80)    RR: 18 (08 Apr 2020 08:19) (18 - 18)    SpO2: 92% (08 Apr 2020 08:19) (92% - 92%)        Physical Exam:    General: Obese female, NAD, resting comfortably in bed, no O2 supplementation in place    Head: normocephalic, atraumatic    Eyes: EOMI    Pulm: unlabored breathing, good air entry, Mild rhonchi in mid lobes bilaterally, no crackles or wheezes    Cardio: S1S2+, RRR, no murmurs    GI: soft, non-distended, non-tender, non guarding    Vascular: no peripheral edema, DP pulses 2+ b/l, no calf tenderness    MSK: FROM in all extremities    Neuro: alert and oriented, no gross focal deficits        Please continue to take all your medications as prescribed.         Please continue to take supplimental oxygen at 2 liters per minute during rest and ambulation to keep your oxygen saturation above 92%. Follow up with your PCP in 1-2 days of discharge for further recommendations on continuing oxygen.        Please return to the ER immediately if your symptoms worsen or if you have shortness of breath. 48 y/o F with PMHx of old TBI, Hypothyroidism, DVT s/p IVC filter who resides at home with sister who helps take care of her. Patient c/o increasing dry cough, diffuse body aches, and fever for 1 week. Patient sister is COVID pos. She was taken UC and found to be febrile and hypoxic. She was then sent to [facility] ED. In the ED she was found to have hypoxia w/ RA sat of 90, fever of 103, b/l interstitial infiltrates. Pt admitted for acute hypoxic resp failure 2/2 covid.  She was given IVF, Zithromax, oxygen, and albuterol in ED.  Pt was started on high dose vitamin C and thiamine. Incentive spirometer and prone positioning with slow clinical improvement. Now saturating well on 2L nasal cannula at rest and on ambulation.         Pt requiring 2L nasal cannula at rest and ambulation to maintain oxygenation of >92%. Pt requires home O2 due to their acute hypoxic respiratory failure secondary to COVID pneumonia in order to increase hospital capacity and mitigate risk of additional spread.        All electrolyte abnormalities were monitored carefully and repleted as necessary during this hospitalization. At the time of discharge patient was hemodynamically stable and amenable to all terms of discharge. The patient has received verbal instructions from myself regarding discharge plans.         Length of Discharge: 45MIN        Vital Signs Last 24 Hrs    T(C): 36.7 (13 Apr 2020 09:38), Max: 36.9 (13 Apr 2020 04:31)    T(F): 98 (13 Apr 2020 09:38), Max: 98.4 (13 Apr 2020 04:31)    HR: 80 (13 Apr 2020 09:38) (80 - 86)    BP: 102/64 (13 Apr 2020 09:38) (100/75 - 102/64)    RR: 16 (13 Apr 2020 09:38) (16 - 16)    SpO2: 95% (13 Apr 2020 10:16) (87% - 95%)        Physical Exam:    General: Obese female, NAD, resting comfortably in bed, no O2 supplementation in place    Head: normocephalic, atraumatic    Eyes: EOMI    Pulm: unlabored breathing, good air entry, Mild rhonchi in mid lobes bilaterally, no crackles or wheezes    Cardio: S1S2+, RRR, no murmurs    GI: soft, non-distended, non-tender, non guarding    Vascular: no peripheral edema, DP pulses 2+ b/l, no calf tenderness    MSK: FROM in all extremities    Neuro: alert and oriented, no gross focal deficits        Please continue to take all your medications as prescribed.         Please continue to take supplimental oxygen at 2 liters per minute during rest and ambulation to keep your oxygen saturation above 92%. Follow up with your PCP in 1-2 days of discharge for further recommendations on continuing oxygen.        Please return to the ER immediately if your symptoms worsen or if you have shortness of breath. 48 y/o F with PMHx of old TBI, Hypothyroidism, DVT s/p IVC filter who resides at home with sister who helps take care of her. Patient c/o increasing dry cough, diffuse body aches, and fever for 1 week. Patient sister is COVID pos. She was taken UC and found to be febrile and hypoxic. She was then sent to [facility] ED. In the ED she was found to have hypoxia w/ RA sat of 90, fever of 103, b/l interstitial infiltrates. Pt admitted for acute hypoxic resp failure 2/2 covid.  She was given IVF, Zithromax, oxygen, and albuterol in ED.  Pt was started on high dose vitamin C and thiamine. Incentive spirometer and prone positioning with slow clinical improvement. Pt required supplemental oxygen 2-3 lts via NC, able to maintaing O2 sat > 93% well on RA,           All electrolyte abnormalities were monitored carefully and repleted as necessary during this hospitalization. At the time of discharge patient was hemodynamically stable and amenable to all terms of discharge. The patient has received verbal instructions from myself regarding discharge plans.         Length of Discharge: 45MIN        Vital Signs Last 24 Hrs    T(C): 36.7 (13 Apr 2020 09:38), Max: 36.9 (13 Apr 2020 04:31)    T(F): 98 (13 Apr 2020 09:38), Max: 98.4 (13 Apr 2020 04:31)    HR: 80 (13 Apr 2020 09:38) (80 - 86)    BP: 102/64 (13 Apr 2020 09:38) (100/75 - 102/64)    RR: 16 (13 Apr 2020 09:38) (16 - 16)    SpO2: 95% (13 Apr 2020 10:16) (87% - 95%)        Physical Exam:    General: Obese female, NAD, resting comfortably in bed, no O2 supplementation in place    Head: normocephalic, atraumatic    Eyes: EOMI    Pulm: unlabored breathing, good air entry, Mild rhonchi in mid lobes bilaterally, no crackles or wheezes    Cardio: S1S2+, RRR, no murmurs    GI: soft, non-distended, non-tender, non guarding    Vascular: no peripheral edema, DP pulses 2+ b/l, no calf tenderness    MSK: FROM in all extremities    Neuro: alert and oriented, no gross focal deficits        Please continue to take all your medications as prescribed.         Follow up with your PCP in 1-2 days of discharge    Please return to the ER immediately if your symptoms worsen or if you have shortness of breath.

## 2020-04-07 NOTE — DISCHARGE NOTE PROVIDER - PROVIDER TOKENS
FREE:[LAST:[Your Primary Care Physician],PHONE:[(   )    -],FAX:[(   )    -],ADDRESS:[1-2 days of discharge]]

## 2020-04-07 NOTE — DISCHARGE NOTE PROVIDER - NSDCFUADDINST_GEN_ALL_CORE_FT
-Be sure to continue care with your Primary Care Doctor. You should call to make an appointment for hospital follow up within 7 days, be sure to tell them that you were just released from [facility] when making your appointment. Contact information for a suggested provider is available on this paperwork.    -Continue with all medications as prescribed.    -If symptoms return, become worse, and/or if new symptoms appear please seek medical attention immediately with Primary Care Physician and/or Emergency Department (as you may deem appropriate).

## 2020-04-08 LAB
ALBUMIN SERPL ELPH-MCNC: 3.9 G/DL — SIGNIFICANT CHANGE UP (ref 3.3–5.2)
ALP SERPL-CCNC: 76 U/L — SIGNIFICANT CHANGE UP (ref 40–120)
ALT FLD-CCNC: 30 U/L — SIGNIFICANT CHANGE UP
ANION GAP SERPL CALC-SCNC: 14 MMOL/L — SIGNIFICANT CHANGE UP (ref 5–17)
AST SERPL-CCNC: 25 U/L — SIGNIFICANT CHANGE UP
BASOPHILS # BLD AUTO: 0.03 K/UL — SIGNIFICANT CHANGE UP (ref 0–0.2)
BASOPHILS NFR BLD AUTO: 0.4 % — SIGNIFICANT CHANGE UP (ref 0–2)
BILIRUB SERPL-MCNC: 0.5 MG/DL — SIGNIFICANT CHANGE UP (ref 0.4–2)
BUN SERPL-MCNC: 16 MG/DL — SIGNIFICANT CHANGE UP (ref 8–20)
CALCIUM SERPL-MCNC: 9.8 MG/DL — SIGNIFICANT CHANGE UP (ref 8.6–10.2)
CHLORIDE SERPL-SCNC: 100 MMOL/L — SIGNIFICANT CHANGE UP (ref 98–107)
CO2 SERPL-SCNC: 24 MMOL/L — SIGNIFICANT CHANGE UP (ref 22–29)
CREAT SERPL-MCNC: 0.59 MG/DL — SIGNIFICANT CHANGE UP (ref 0.5–1.3)
EOSINOPHIL # BLD AUTO: 0 K/UL — SIGNIFICANT CHANGE UP (ref 0–0.5)
EOSINOPHIL NFR BLD AUTO: 0 % — SIGNIFICANT CHANGE UP (ref 0–6)
GLUCOSE SERPL-MCNC: 100 MG/DL — HIGH (ref 70–99)
HCT VFR BLD CALC: 44.7 % — SIGNIFICANT CHANGE UP (ref 34.5–45)
HGB BLD-MCNC: 13.7 G/DL — SIGNIFICANT CHANGE UP (ref 11.5–15.5)
IMM GRANULOCYTES NFR BLD AUTO: 1.2 % — SIGNIFICANT CHANGE UP (ref 0–1.5)
LYMPHOCYTES # BLD AUTO: 1.47 K/UL — SIGNIFICANT CHANGE UP (ref 1–3.3)
LYMPHOCYTES # BLD AUTO: 21.2 % — SIGNIFICANT CHANGE UP (ref 13–44)
MCHC RBC-ENTMCNC: 27 PG — SIGNIFICANT CHANGE UP (ref 27–34)
MCHC RBC-ENTMCNC: 30.6 GM/DL — LOW (ref 32–36)
MCV RBC AUTO: 88.2 FL — SIGNIFICANT CHANGE UP (ref 80–100)
MONOCYTES # BLD AUTO: 0.57 K/UL — SIGNIFICANT CHANGE UP (ref 0–0.9)
MONOCYTES NFR BLD AUTO: 8.2 % — SIGNIFICANT CHANGE UP (ref 2–14)
NEUTROPHILS # BLD AUTO: 4.78 K/UL — SIGNIFICANT CHANGE UP (ref 1.8–7.4)
NEUTROPHILS NFR BLD AUTO: 69 % — SIGNIFICANT CHANGE UP (ref 43–77)
PLATELET # BLD AUTO: 335 K/UL — SIGNIFICANT CHANGE UP (ref 150–400)
POTASSIUM SERPL-MCNC: 4.7 MMOL/L — SIGNIFICANT CHANGE UP (ref 3.5–5.3)
POTASSIUM SERPL-SCNC: 4.7 MMOL/L — SIGNIFICANT CHANGE UP (ref 3.5–5.3)
PROT SERPL-MCNC: 7.4 G/DL — SIGNIFICANT CHANGE UP (ref 6.6–8.7)
RBC # BLD: 5.07 M/UL — SIGNIFICANT CHANGE UP (ref 3.8–5.2)
RBC # FLD: 13.9 % — SIGNIFICANT CHANGE UP (ref 10.3–14.5)
SODIUM SERPL-SCNC: 138 MMOL/L — SIGNIFICANT CHANGE UP (ref 135–145)
WBC # BLD: 6.93 K/UL — SIGNIFICANT CHANGE UP (ref 3.8–10.5)
WBC # FLD AUTO: 6.93 K/UL — SIGNIFICANT CHANGE UP (ref 3.8–10.5)

## 2020-04-08 PROCEDURE — 99232 SBSQ HOSP IP/OBS MODERATE 35: CPT

## 2020-04-08 RX ADMIN — SERTRALINE 100 MILLIGRAM(S): 25 TABLET, FILM COATED ORAL at 14:51

## 2020-04-08 RX ADMIN — Medication 1000 MILLIGRAM(S): at 14:51

## 2020-04-08 RX ADMIN — ALBUTEROL 2 PUFF(S): 90 AEROSOL, METERED ORAL at 21:05

## 2020-04-08 RX ADMIN — Medication 100 MILLIGRAM(S): at 07:26

## 2020-04-08 RX ADMIN — Medication 100 MILLIGRAM(S): at 14:50

## 2020-04-08 RX ADMIN — Medication 137 MICROGRAM(S): at 07:26

## 2020-04-08 RX ADMIN — ENOXAPARIN SODIUM 40 MILLIGRAM(S): 100 INJECTION SUBCUTANEOUS at 14:49

## 2020-04-08 RX ADMIN — Medication 100 MILLIGRAM(S): at 21:05

## 2020-04-08 RX ADMIN — Medication 200 MILLIGRAM(S): at 14:50

## 2020-04-08 RX ADMIN — ALBUTEROL 2 PUFF(S): 90 AEROSOL, METERED ORAL at 07:26

## 2020-04-08 NOTE — PROGRESS NOTE ADULT - SUBJECTIVE AND OBJECTIVE BOX
Patient is a 49y old  Female who presents with a chief complaint of Hypoxia. Viral PNA. R/O COVID (01 Apr 2020 14:19)    Interval/Overnight Events:  Patient seen and examined at bedside. No acute overnight events. Currently still requiring 2L nasal cannula at rest and ambulation. Agrees to discharge in AM, once home oxygen can be set up. Desats to 89% at 2 lt NC at on ambulation.     ROS: Denies nausea, vomiting, chest pain, palpitations, abdominal pain, diarrhea, dysuria. All other ROS as per hpi.    Vital Signs Last 24 Hrs  T(C): 37.3 (08 Apr 2020 17:05), Max: 37.3 (08 Apr 2020 17:05)  T(F): 99.1 (08 Apr 2020 17:05), Max: 99.1 (08 Apr 2020 17:05)  HR: 88 (08 Apr 2020 17:05) (79 - 114)  BP: 107/71 (08 Apr 2020 17:05) (101/66 - 121/80)  RR: 20 (08 Apr 2020 17:05) (18 - 20)  SpO2: 93% (08 Apr 2020 17:05) (92% - 93%)    Physical Exam:  General: Obese female, NAD, resting comfortably in bed, 2L nasal cannula O2 supplementation in place  Head: normocephalic, atraumatic  Eyes: EOMI  Pulm: unlabored breathing, good air entry, Mild rhonchi in mid lobes bilaterally, no crackles or wheezes  Cardio: S1S2+, RRR, no murmurs  GI: soft, non-distended, non-tender, non guarding  Vascular: no peripheral edema, DP pulses 2+ b/l, no calf tenderness  MSK: FROM in all extremities  Neuro: alert and oriented, no gross focal deficits    Labs:             04-07  140  |  101  |  14.0  ----------------------------<  86  5.0   |  23.0  |  0.47<L>    Ca    9.0      07 Apr 2020 07:02    TPro  7.2  /  Alb  3.5  /  TBili  0.4  /  DBili  x   /  AST  32<H>  /  ALT  30  /  AlkPhos  64  04-07    MEDICATIONS  (STANDING):  ALBUTerol    90 MICROgram(s) HFA Inhaler 2 Puff(s) Inhalation every 6 hours  ascorbic acid 1000 milliGRAM(s) Oral daily  benzonatate 100 milliGRAM(s) Oral every 8 hours  enoxaparin Injectable 40 milliGRAM(s) SubCutaneous daily  levothyroxine 137 MICROGram(s) Oral daily  sertraline 100 milliGRAM(s) Oral daily  thiamine 200 milliGRAM(s) Oral daily    MEDICATIONS  (PRN):  acetaminophen   Tablet .. 650 milliGRAM(s) Oral every 6 hours PRN Temp greater or equal to 38C (100.4F), Mild Pain (1 - 3)  aluminum hydroxide/magnesium hydroxide/simethicone Suspension 30 milliLiter(s) Oral every 4 hours PRN Dyspepsia  ondansetron Injectable 4 milliGRAM(s) IV Push every 6 hours PRN Nausea
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
## 2020-04-09 ENCOUNTER — TRANSCRIPTION ENCOUNTER (OUTPATIENT)
Age: 50
End: 2020-04-09

## 2020-04-09 DIAGNOSIS — E11.9 TYPE 2 DIABETES MELLITUS WITHOUT COMPLICATIONS: ICD-10-CM

## 2020-04-09 PROCEDURE — 99232 SBSQ HOSP IP/OBS MODERATE 35: CPT

## 2020-04-09 RX ADMIN — ALBUTEROL 2 PUFF(S): 90 AEROSOL, METERED ORAL at 12:00

## 2020-04-09 RX ADMIN — Medication 100 MILLIGRAM(S): at 05:40

## 2020-04-09 RX ADMIN — Medication 200 MILLIGRAM(S): at 11:48

## 2020-04-09 RX ADMIN — ENOXAPARIN SODIUM 40 MILLIGRAM(S): 100 INJECTION SUBCUTANEOUS at 11:48

## 2020-04-09 RX ADMIN — Medication 1000 MILLIGRAM(S): at 11:48

## 2020-04-09 RX ADMIN — SERTRALINE 100 MILLIGRAM(S): 25 TABLET, FILM COATED ORAL at 11:48

## 2020-04-09 RX ADMIN — ALBUTEROL 2 PUFF(S): 90 AEROSOL, METERED ORAL at 09:00

## 2020-04-09 RX ADMIN — Medication 137 MICROGRAM(S): at 05:41

## 2020-04-09 RX ADMIN — Medication 100 MILLIGRAM(S): at 16:49

## 2020-04-09 RX ADMIN — ALBUTEROL 2 PUFF(S): 90 AEROSOL, METERED ORAL at 05:32

## 2020-04-09 NOTE — DISCHARGE NOTE NURSING/CASE MANAGEMENT/SOCIAL WORK - NSDCFUADDAPPT_GEN_ALL_CORE_FT
Pt requires home oxygen due to their COVID-19 B.34.2 in order to increase hospital capacity and mitigate risk of additional spread.”
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
## 2020-04-09 NOTE — CHART NOTE - NSCHARTNOTEFT_GEN_A_CORE
Pt requiring 2L nasal cannula at rest and ambulation to maintain oxygenation of >92%. Pt requires home O2 due to their acute hypoxic respiratory failure secondary to COVID pneumonia in order to increase hospital capacity and mitigate risk of additional spread.

## 2020-04-09 NOTE — PROGRESS NOTE ADULT - SUBJECTIVE AND OBJECTIVE BOX
Patient is a 49y old  Female who presents with a chief complaint of Hypoxia. Viral PNA. R/O COVID (01 Apr 2020 14:19)    Interval/Overnight Events:  Patient seen and examined at bedside. No acute overnight events. Currently still requiring 1L nasal cannula at rest and 2 L for ambulation to sat >91%. Understands we are pending home oxygen can be set up for discharge.     ROS: Denies nausea, vomiting, chest pain, palpitations, abdominal pain, diarrhea, dysuria. All other ROS as per hpi.    Vital Signs Last 24 Hrs  T(C): 37 (09 Apr 2020 08:21), Max: 37.3 (08 Apr 2020 17:05)  T(F): 98.6 (09 Apr 2020 08:21), Max: 99.1 (08 Apr 2020 17:05)  HR: 74 (09 Apr 2020 08:21) (74 - 114)  BP: 103/68 (09 Apr 2020 08:21) (103/68 - 134/82)  RR: 18 (09 Apr 2020 08:21) (18 - 20)  SpO2: 93% (09 Apr 2020 08:21) (92% - 93%)    Physical Exam:  General: Obese female, NAD, resting comfortably in bed, 2L nasal cannula O2 supplementation in place  Head: normocephalic, atraumatic  Eyes: EOMI  Pulm: unlabored breathing, good air entry, Mild rhonchi in mid lobes bilaterally, no crackles or wheezes  Cardio: S1S2+, RRR, no murmurs  GI: soft, non-distended, non-tender, non guarding  Vascular: no peripheral edema, DP pulses 2+ b/l, no calf tenderness  MSK: FROM in all extremities  Neuro: alert and oriented, no gross focal deficits    Labs:                                   13.7   6.93  )-----------( 335      ( 08 Apr 2020 10:40 )             44.7     04-08    138  |  100  |  16.0  ----------------------------<  100<H>  4.7   |  24.0  |  0.59    Ca    9.8      08 Apr 2020 10:40    TPro  7.4  /  Alb  3.9  /  TBili  0.5  /  DBili  x   /  AST  25  /  ALT  30  /  AlkPhos  76  04-08      MEDICATIONS  (STANDING):  ALBUTerol    90 MICROgram(s) HFA Inhaler 2 Puff(s) Inhalation every 6 hours  ascorbic acid 1000 milliGRAM(s) Oral daily  benzonatate 100 milliGRAM(s) Oral every 8 hours  enoxaparin Injectable 40 milliGRAM(s) SubCutaneous daily  levothyroxine 137 MICROGram(s) Oral daily  sertraline 100 milliGRAM(s) Oral daily  thiamine 200 milliGRAM(s) Oral daily    MEDICATIONS  (PRN):  acetaminophen   Tablet .. 650 milliGRAM(s) Oral every 6 hours PRN Temp greater or equal to 38C (100.4F), Mild Pain (1 - 3)  aluminum hydroxide/magnesium hydroxide/simethicone Suspension 30 milliLiter(s) Oral every 4 hours PRN Dyspepsia  ondansetron Injectable 4 milliGRAM(s) IV Push every 6 hours PRN Nausea

## 2020-04-09 NOTE — DISCHARGE NOTE NURSING/CASE MANAGEMENT/SOCIAL WORK - PATIENT PORTAL LINK FT
You can access the FollowMyHealth Patient Portal offered by [facility] by registering at the following website: [email or web address]. By joining [employer]’s FollowMyHealth portal, you will also be able to view your health information using other applications (apps) compatible with our system.

## 2020-04-10 LAB
GLUCOSE BLDC GLUCOMTR-MCNC: 88 MG/DL — SIGNIFICANT CHANGE UP (ref 70–99)
GLUCOSE BLDC GLUCOMTR-MCNC: 97 MG/DL — SIGNIFICANT CHANGE UP (ref 70–99)

## 2020-04-10 PROCEDURE — 99232 SBSQ HOSP IP/OBS MODERATE 35: CPT

## 2020-04-10 RX ADMIN — Medication 200 MILLIGRAM(S): at 13:05

## 2020-04-10 RX ADMIN — Medication 100 MILLIGRAM(S): at 00:53

## 2020-04-10 RX ADMIN — ENOXAPARIN SODIUM 40 MILLIGRAM(S): 100 INJECTION SUBCUTANEOUS at 13:06

## 2020-04-10 RX ADMIN — Medication 100 MILLIGRAM(S): at 13:06

## 2020-04-10 RX ADMIN — ALBUTEROL 2 PUFF(S): 90 AEROSOL, METERED ORAL at 05:52

## 2020-04-10 RX ADMIN — ALBUTEROL 2 PUFF(S): 90 AEROSOL, METERED ORAL at 00:53

## 2020-04-10 RX ADMIN — Medication 137 MICROGRAM(S): at 05:52

## 2020-04-10 RX ADMIN — Medication 1000 MILLIGRAM(S): at 13:06

## 2020-04-10 RX ADMIN — Medication 100 MILLIGRAM(S): at 23:01

## 2020-04-10 RX ADMIN — Medication 100 MILLIGRAM(S): at 09:59

## 2020-04-10 RX ADMIN — SERTRALINE 100 MILLIGRAM(S): 25 TABLET, FILM COATED ORAL at 14:59

## 2020-04-10 RX ADMIN — ALBUTEROL 2 PUFF(S): 90 AEROSOL, METERED ORAL at 23:02
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
## 2020-04-10 NOTE — PROGRESS NOTE ADULT - SUBJECTIVE AND OBJECTIVE BOX
Patient is a 49y old  Female who presents with a chief complaint of Hypoxia. Viral PNA. R/O COVID (01 Apr 2020 14:19)    Interval/Overnight Events:  Patient seen and examined at bedside. No acute overnight events. Currently still requiring 1L nasal cannula at rest and 2 L for ambulation to sat >91%. Understands we are pending home oxygen can be set up for discharge. At this time, her insurance company is out of stock of oxygen tanks, and other vendors have declined requests. Will need to saturate appropriately on RA and ambulation for discharge. Is tolerating PO, voiding, and having BMs at baseline.     ROS: Denies nausea, vomiting, chest pain, palpitations, abdominal pain, diarrhea, dysuria. All other ROS as per hpi.    Vital Signs Last 24 Hrs  T(C): 36.6 (10 Apr 2020 10:01), Max: 36.9 (10 Apr 2020 00:45)  T(F): 97.8 (10 Apr 2020 10:01), Max: 98.5 (10 Apr 2020 00:45)  HR: 85 (10 Apr 2020 10:01) (85 - 92)  BP: 109/78 (10 Apr 2020 10:01) (100/60 - 109/78)  RR: 18 (10 Apr 2020 10:01) (18 - 19)  SpO2: 99% (10 Apr 2020 10:01) (93% - 99%)    Physical Exam:  General: Obese female, NAD, resting comfortably in bed, 2L nasal cannula O2 supplementation in place  Head: normocephalic, atraumatic  Eyes: EOMI  Pulm: unlabored breathing, good air entry, Mild rhonchi in mid lobes bilaterally, no crackles or wheezes  Cardio: S1S2+, RRR, no murmurs  GI: soft, non-distended, non-tender, non guarding  Vascular: no peripheral edema, DP pulses 2+ b/l, no calf tenderness  MSK: FROM in all extremities  Neuro: alert and oriented, no gross focal deficits    Labs:                                   13.7   6.93  )-----------( 335      ( 08 Apr 2020 10:40 )             44.7     04-08    138  |  100  |  16.0  ----------------------------<  100<H>  4.7   |  24.0  |  0.59    Ca    9.8      08 Apr 2020 10:40    TPro  7.4  /  Alb  3.9  /  TBili  0.5  /  DBili  x   /  AST  25  /  ALT  30  /  AlkPhos  76  04-08      MEDICATIONS  (STANDING):  ALBUTerol    90 MICROgram(s) HFA Inhaler 2 Puff(s) Inhalation every 6 hours  ascorbic acid 1000 milliGRAM(s) Oral daily  benzonatate 100 milliGRAM(s) Oral every 8 hours  enoxaparin Injectable 40 milliGRAM(s) SubCutaneous daily  levothyroxine 137 MICROGram(s) Oral daily  sertraline 100 milliGRAM(s) Oral daily  thiamine 200 milliGRAM(s) Oral daily    MEDICATIONS  (PRN):  acetaminophen   Tablet .. 650 milliGRAM(s) Oral every 6 hours PRN Temp greater or equal to 38C (100.4F), Mild Pain (1 - 3)  aluminum hydroxide/magnesium hydroxide/simethicone Suspension 30 milliLiter(s) Oral every 4 hours PRN Dyspepsia  ondansetron Injectable 4 milliGRAM(s) IV Push every 6 hours PRN Nausea

## 2020-04-11 PROCEDURE — 99232 SBSQ HOSP IP/OBS MODERATE 35: CPT | Mod: GC

## 2020-04-11 RX ORDER — ENOXAPARIN SODIUM 100 MG/ML
40 INJECTION SUBCUTANEOUS EVERY 12 HOURS
Refills: 0 | Status: DISCONTINUED | OUTPATIENT
Start: 2020-04-11 | End: 2020-04-13

## 2020-04-11 RX ADMIN — ALBUTEROL 2 PUFF(S): 90 AEROSOL, METERED ORAL at 17:48

## 2020-04-11 RX ADMIN — Medication 1000 MILLIGRAM(S): at 13:34

## 2020-04-11 RX ADMIN — ENOXAPARIN SODIUM 40 MILLIGRAM(S): 100 INJECTION SUBCUTANEOUS at 13:33

## 2020-04-11 RX ADMIN — SERTRALINE 100 MILLIGRAM(S): 25 TABLET, FILM COATED ORAL at 13:33

## 2020-04-11 RX ADMIN — Medication 100 MILLIGRAM(S): at 22:11

## 2020-04-11 RX ADMIN — Medication 200 MILLIGRAM(S): at 13:34

## 2020-04-11 RX ADMIN — Medication 100 MILLIGRAM(S): at 05:44

## 2020-04-11 RX ADMIN — Medication 137 MICROGRAM(S): at 06:02

## 2020-04-11 RX ADMIN — ALBUTEROL 2 PUFF(S): 90 AEROSOL, METERED ORAL at 22:11

## 2020-04-11 RX ADMIN — ALBUTEROL 2 PUFF(S): 90 AEROSOL, METERED ORAL at 05:46

## 2020-04-11 RX ADMIN — ENOXAPARIN SODIUM 40 MILLIGRAM(S): 100 INJECTION SUBCUTANEOUS at 22:11

## 2020-04-11 RX ADMIN — Medication 100 MILLIGRAM(S): at 13:34
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
## 2020-04-11 NOTE — PROGRESS NOTE ADULT - SUBJECTIVE AND OBJECTIVE BOX
Patient is a 49y old  Female who presents with a chief complaint of Hypoxia. Viral PNA. R/O COVID (01 Apr 2020 14:19)    Interval/Overnight Events:  Patient seen and examined at bedside. No acute overnight events. Currently still requiring 1L nasal cannula at rest while awake and 2 L for ambulation to sat >91%. This AM noted to desat to 88% while asleep off suppimental O2, improved with 2L. Understands we are pending home oxygen to be delivered for discharge. Declines transfer to [facility] Is tolerating PO, voiding, and having BMs at baseline.     ROS: Denies nausea, vomiting, chest pain, palpitations, abdominal pain, diarrhea, dysuria. All other ROS as per hpi.    Vital Signs Last 24 Hrs  T(C): 36.6 (11 Apr 2020 05:42), Max: 36.7 (10 Apr 2020 17:46)  T(F): 97.8 (11 Apr 2020 05:42), Max: 98 (10 Apr 2020 17:46)  HR: 99 (11 Apr 2020 05:42) (91 - 99)  BP: 125/77 (11 Apr 2020 05:42) (125/77 - 146/85)  RR: 20 (11 Apr 2020 05:42) (18 - 20)  SpO2: 96% (11 Apr 2020 05:42) (95% - 96%)    Physical Exam:  General: Obese female, NAD, resting comfortably in bed, 2L nasal cannula O2 supplementation in place  Head: normocephalic, atraumatic  Eyes: EOMI  Pulm: unlabored breathing, good air entry, Mild rhonchi in mid lobes bilaterally, no crackles or wheezes  Cardio: S1S2+, RRR, no murmurs  GI: soft, non-distended, non-tender, non guarding  Vascular: no peripheral edema, DP pulses 2+ b/l, no calf tenderness  MSK: FROM in all extremities  Neuro: alert and oriented, no gross focal deficits    Labs:                                   13.7   6.93  )-----------( 335      ( 08 Apr 2020 10:40 )             44.7     04-08    138  |  100  |  16.0  ----------------------------<  100<H>  4.7   |  24.0  |  0.59    Ca    9.8      08 Apr 2020 10:40    TPro  7.4  /  Alb  3.9  /  TBili  0.5  /  DBili  x   /  AST  25  /  ALT  30  /  AlkPhos  76  04-08    MEDICATIONS  (STANDING):  ALBUTerol    90 MICROgram(s) HFA Inhaler 2 Puff(s) Inhalation every 6 hours  ascorbic acid 1000 milliGRAM(s) Oral daily  benzonatate 100 milliGRAM(s) Oral every 8 hours  enoxaparin Injectable 40 milliGRAM(s) SubCutaneous daily  levothyroxine 137 MICROGram(s) Oral daily  sertraline 100 milliGRAM(s) Oral daily  thiamine 200 milliGRAM(s) Oral daily    MEDICATIONS  (PRN):  acetaminophen   Tablet .. 650 milliGRAM(s) Oral every 6 hours PRN Temp greater or equal to 38C (100.4F), Mild Pain (1 - 3)  aluminum hydroxide/magnesium hydroxide/simethicone Suspension 30 milliLiter(s) Oral every 4 hours PRN Dyspepsia  ondansetron Injectable 4 milliGRAM(s) IV Push every 6 hours PRN Nausea

## 2020-04-12 PROCEDURE — 99232 SBSQ HOSP IP/OBS MODERATE 35: CPT | Mod: GC

## 2020-04-12 RX ADMIN — Medication 200 MILLIGRAM(S): at 13:29

## 2020-04-12 RX ADMIN — ALBUTEROL 2 PUFF(S): 90 AEROSOL, METERED ORAL at 13:29

## 2020-04-12 RX ADMIN — ENOXAPARIN SODIUM 40 MILLIGRAM(S): 100 INJECTION SUBCUTANEOUS at 21:03

## 2020-04-12 RX ADMIN — ALBUTEROL 2 PUFF(S): 90 AEROSOL, METERED ORAL at 17:11

## 2020-04-12 RX ADMIN — Medication 100 MILLIGRAM(S): at 05:19

## 2020-04-12 RX ADMIN — ENOXAPARIN SODIUM 40 MILLIGRAM(S): 100 INJECTION SUBCUTANEOUS at 13:29

## 2020-04-12 RX ADMIN — Medication 100 MILLIGRAM(S): at 13:29

## 2020-04-12 RX ADMIN — SERTRALINE 100 MILLIGRAM(S): 25 TABLET, FILM COATED ORAL at 16:44

## 2020-04-12 RX ADMIN — ALBUTEROL 2 PUFF(S): 90 AEROSOL, METERED ORAL at 05:19

## 2020-04-12 RX ADMIN — Medication 100 MILLIGRAM(S): at 21:03

## 2020-04-12 RX ADMIN — Medication 1000 MILLIGRAM(S): at 13:30

## 2020-04-12 RX ADMIN — Medication 137 MICROGRAM(S): at 05:19
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
## 2020-04-12 NOTE — PROGRESS NOTE ADULT - SUBJECTIVE AND OBJECTIVE BOX
Patient is a 49y old  Female who presents with a chief complaint of Hypoxia. Viral PNA. R/O COVID (01 Apr 2020 14:19)    Interval/Overnight Events:  Patient seen and examined at bedside. No acute overnight events. Currently still requiring 1L nasal cannula at rest while awake and 2 L for ambulation to sat >91%. This AM noted to desat to 88% while asleep off suppimental O2, improved with 2L. Understands we are pending home oxygen to be delivered for discharge. Declines transfer to [facility] Is tolerating PO, voiding, and having BMs at baseline.     ROS: Denies nausea, vomiting, chest pain, palpitations, abdominal pain, diarrhea, dysuria. All other ROS as per hpi.    Vital Signs Last 24 Hrs  T(C): 36.6 (11 Apr 2020 05:42), Max: 36.7 (10 Apr 2020 17:46)  T(F): 97.8 (11 Apr 2020 05:42), Max: 98 (10 Apr 2020 17:46)  HR: 99 (11 Apr 2020 05:42) (91 - 99)  BP: 125/77 (11 Apr 2020 05:42) (125/77 - 146/85)  RR: 20 (11 Apr 2020 05:42) (18 - 20)  SpO2: 96% (11 Apr 2020 05:42) (95% - 96%)    Physical Exam:  General: Obese female, NAD, resting comfortably in bed, 2L nasal cannula O2 supplementation in place  Head: normocephalic, atraumatic  Eyes: EOMI  Pulm: unlabored breathing, good air entry, Mild rhonchi in mid lobes bilaterally, no crackles or wheezes  Cardio: S1S2+, RRR, no murmurs  GI: soft, non-distended, non-tender, non guarding  Vascular: no peripheral edema, DP pulses 2+ b/l, no calf tenderness  MSK: FROM in all extremities  Neuro: alert and oriented, no gross focal deficits    Labs:                                   13.7   6.93  )-----------( 335      ( 08 Apr 2020 10:40 )             44.7     04-08    138  |  100  |  16.0  ----------------------------<  100<H>  4.7   |  24.0  |  0.59    Ca    9.8      08 Apr 2020 10:40    TPro  7.4  /  Alb  3.9  /  TBili  0.5  /  DBili  x   /  AST  25  /  ALT  30  /  AlkPhos  76  04-08    MEDICATIONS  (STANDING):  ALBUTerol    90 MICROgram(s) HFA Inhaler 2 Puff(s) Inhalation every 6 hours  ascorbic acid 1000 milliGRAM(s) Oral daily  benzonatate 100 milliGRAM(s) Oral every 8 hours  enoxaparin Injectable 40 milliGRAM(s) SubCutaneous daily  levothyroxine 137 MICROGram(s) Oral daily  sertraline 100 milliGRAM(s) Oral daily  thiamine 200 milliGRAM(s) Oral daily    MEDICATIONS  (PRN):  acetaminophen   Tablet .. 650 milliGRAM(s) Oral every 6 hours PRN Temp greater or equal to 38C (100.4F), Mild Pain (1 - 3)  aluminum hydroxide/magnesium hydroxide/simethicone Suspension 30 milliLiter(s) Oral every 4 hours PRN Dyspepsia  ondansetron Injectable 4 milliGRAM(s) IV Push every 6 hours PRN Nausea Patient is a 49y old  Female who presents with a chief complaint of Hypoxia. Viral PNA. R/O COVID (01 Apr 2020 14:19)    Interval/Overnight Events:  Patient seen and examined at bedside. No acute overnight events. Currently still requiring 1 - 2L nasal cannula when ambulating satting in the 90s. Pt able to tolerate room air at rest however will likely need oxygen supplementation at home.Understands we are pending home oxygen to be delivered for discharge. Declines transfer to [facility] Is tolerating PO, voiding, and having BMs at baseline.     ROS: Denies nausea, vomiting, chest pain, palpitations, abdominal pain, diarrhea, dysuria. All other ROS as per hpi.    Vital Signs Last 24 Hrs  T(C): 36.4 (12 Apr 2020 05:34), Max: 36.9 (11 Apr 2020 22:30)  T(F): 97.6 (12 Apr 2020 05:34), Max: 98.4 (11 Apr 2020 22:30)  HR: 81 (12 Apr 2020 05:34) (81 - 88)  BP: 108/74 (12 Apr 2020 05:34) (108/74 - 117/78)  RR: 19 (12 Apr 2020 05:34) (18 - 20)  SpO2: 93% (12 Apr 2020 05:34) (93% - 96%) on room air/NC    Physical Exam:  General: Obese female, NAD, resting comfortably in bed, 2L nasal cannula O2 supplementation in place  Head: normocephalic, atraumatic  Eyes: EOMI  Pulm: unlabored breathing, good air entry, Mild rhonchi in mid lobes bilaterally, no crackles or wheezes  Cardio: S1S2+, RRR, no murmurs  GI: soft, non-distended, non-tender, non guarding  Vascular: no peripheral edema, DP pulses 2+ b/l, no calf tenderness  MSK: FROM in all extremities  Neuro: alert and oriented, no gross focal deficits    Labs:                                   13.7   6.93  )-----------( 335      ( 08 Apr 2020 10:40 )             44.7     04-08    138  |  100  |  16.0  ----------------------------<  100<H>  4.7   |  24.0  |  0.59    Ca    9.8      08 Apr 2020 10:40    TPro  7.4  /  Alb  3.9  /  TBili  0.5  /  DBili  x   /  AST  25  /  ALT  30  /  AlkPhos  76  04-08    MEDICATIONS  (STANDING):  ALBUTerol    90 MICROgram(s) HFA Inhaler 2 Puff(s) Inhalation every 6 hours  ascorbic acid 1000 milliGRAM(s) Oral daily  benzonatate 100 milliGRAM(s) Oral every 8 hours  enoxaparin Injectable 40 milliGRAM(s) SubCutaneous every 12 hours  levothyroxine 137 MICROGram(s) Oral daily  sertraline 100 milliGRAM(s) Oral daily  thiamine 200 milliGRAM(s) Oral daily    MEDICATIONS  (PRN):  acetaminophen   Tablet .. 650 milliGRAM(s) Oral every 6 hours PRN Temp greater or equal to 38C (100.4F), Mild Pain (1 - 3)  aluminum hydroxide/magnesium hydroxide/simethicone Suspension 30 milliLiter(s) Oral every 4 hours PRN Dyspepsia  ondansetron Injectable 4 milliGRAM(s) IV Push every 6 hours PRN Nausea

## 2020-04-12 NOTE — PROGRESS NOTE ADULT - ATTENDING COMMENTS
I have personally seen and examined the patient. I agree with the above history, physical and plan which I have reviewed.  Patient still dependent on oxygen supplement.  Oxygen saturation to 89% upon standing up.  Patient expresses desire to go home but her insurance company is not providing home oxygen at this time.  Will continue to try to wean off oxygen.
I have personally seen and examined the patient. I agree with the above history, physical and plan which I have reviewed. Patient is still requiring high level of oxygen due to COVID-19.  Will continue current management.
I saw and evaluated the patient and discussed with resident team on rounds this AM.  Admitted with viral PNA secondary to COVID19.  Patient appears to be clinically improving with supportive care.  Will continue to monitor and attempt to wean supplemental O2 therapy as tolerated.   Once patient is able to ambulate on room air, will prepare for discharge.  Remainder of plan as outline in above resident note.
Patient seen and examined and case discussed with resident team.  Still had an episode of desaturation while ambulating today.  Discharge is waiting on approval for home O2 therapy.    Anticipate D/C within 24 hours once home O2 can be arranged.   Remainder of plan as per resident note above.
Patient seen and examined and discussed with resident team on morning rounds.  Patient still unable to tolerate ambulation on room air without O2 desaturation.    Unable to obtain home O2 due to supplier and insurance at this time.    Not safe for discharge until patient can be off O2 therapy or have supply of O2 at home.  Remainder of plan as per resident note.
Patient seen and examined and discussed with resident team on morning rounds.  Unable to wean off oxygen by nasal canula at this point.  Will continue to wean O2 as tolerated.    Continue supportive care and encouraged incentive spirometry.    Remainder of plan as outlined above in resident note.
Patient seen and examined and discussed with resident team on rounds.  Patient has clinically improved, but still in need of Oxygen on discharge due to desaturation with ambulation.  Currently awaiting home O2, otherwise ready for discharge.  Remainder of plan as per resident note.
Patient seen, evaluated and examined by me.  Case discussed with resident.  Agree with the resident's findings and plan as documented in the resident's note.  Patient still with some labored breathing while ambulating and still oxygen dependent.  Will continue with current management.
I have personally seen and examined the patient. I agree with the above history, physical and plan which I have reviewed and edited as appropriate. Patient awake, alert, oriented, calm, comfortable, no complaints. Cardiopulmonary exam is within normal limits. States that she wants to go home.  When off the supplemental oxygen O2 sat drops in the 70's.  Patient is oxygen dependent and needs home for home but her medical insurance company is not authorizing home oxygen with other vendors at this time except their own vendor who is out of supply.  Will continue current management for now.
Patient seen, evaluated and examined by me.  Case discussed with resident.  Agree with the resident's findings and plan as documented in the resident's note, unless noted below.  Patient's condition is guarded, explained in details plan of care.  Patient wants to be full code including intubation if warranted.
Patient had sepsis POA, likely from  Viral Pneumonia, resolving.   Morbid Obesity: will have dietician consult, low caloric diet
Patient had sepsis POA, likely from  Viral Pneumonia, resolving.   Morbid Obesity: will have dietician consult, low caloric diet

## 2020-04-12 NOTE — PROGRESS NOTE ADULT - REASON FOR ADMISSION
Hypoxia  Viral PNA  R/O COVID

## 2020-04-13 VITALS — OXYGEN SATURATION: 95 %

## 2020-04-13 PROCEDURE — 84100 ASSAY OF PHOSPHORUS: CPT

## 2020-04-13 PROCEDURE — 86140 C-REACTIVE PROTEIN: CPT

## 2020-04-13 PROCEDURE — 82728 ASSAY OF FERRITIN: CPT

## 2020-04-13 PROCEDURE — 85610 PROTHROMBIN TIME: CPT

## 2020-04-13 PROCEDURE — 94640 AIRWAY INHALATION TREATMENT: CPT

## 2020-04-13 PROCEDURE — 99285 EMERGENCY DEPT VISIT HI MDM: CPT | Mod: 25

## 2020-04-13 PROCEDURE — 85730 THROMBOPLASTIN TIME PARTIAL: CPT

## 2020-04-13 PROCEDURE — 80048 BASIC METABOLIC PNL TOTAL CA: CPT

## 2020-04-13 PROCEDURE — 83615 LACTATE (LD) (LDH) ENZYME: CPT

## 2020-04-13 PROCEDURE — 82962 GLUCOSE BLOOD TEST: CPT

## 2020-04-13 PROCEDURE — 83036 HEMOGLOBIN GLYCOSYLATED A1C: CPT

## 2020-04-13 PROCEDURE — 81001 URINALYSIS AUTO W/SCOPE: CPT

## 2020-04-13 PROCEDURE — 71045 X-RAY EXAM CHEST 1 VIEW: CPT

## 2020-04-13 PROCEDURE — 84145 PROCALCITONIN (PCT): CPT

## 2020-04-13 PROCEDURE — 87040 BLOOD CULTURE FOR BACTERIA: CPT

## 2020-04-13 PROCEDURE — 87631 RESP VIRUS 3-5 TARGETS: CPT

## 2020-04-13 PROCEDURE — 87086 URINE CULTURE/COLONY COUNT: CPT

## 2020-04-13 PROCEDURE — 87635 SARS-COV-2 COVID-19 AMP PRB: CPT

## 2020-04-13 PROCEDURE — 84484 ASSAY OF TROPONIN QUANT: CPT

## 2020-04-13 PROCEDURE — 36415 COLL VENOUS BLD VENIPUNCTURE: CPT

## 2020-04-13 PROCEDURE — 93005 ELECTROCARDIOGRAM TRACING: CPT

## 2020-04-13 PROCEDURE — 85027 COMPLETE CBC AUTOMATED: CPT

## 2020-04-13 PROCEDURE — 84702 CHORIONIC GONADOTROPIN TEST: CPT

## 2020-04-13 PROCEDURE — 80053 COMPREHEN METABOLIC PANEL: CPT

## 2020-04-13 PROCEDURE — 85652 RBC SED RATE AUTOMATED: CPT

## 2020-04-13 PROCEDURE — 83605 ASSAY OF LACTIC ACID: CPT

## 2020-04-13 PROCEDURE — 99238 HOSP IP/OBS DSCHRG MGMT 30/<: CPT

## 2020-04-13 RX ORDER — ACETAMINOPHEN 500 MG
2 TABLET ORAL
Qty: 0 | Refills: 0 | DISCHARGE
Start: 2020-04-13

## 2020-04-13 RX ADMIN — SERTRALINE 100 MILLIGRAM(S): 25 TABLET, FILM COATED ORAL at 11:17

## 2020-04-13 RX ADMIN — Medication 100 MILLIGRAM(S): at 11:17

## 2020-04-13 RX ADMIN — Medication 137 MICROGRAM(S): at 04:28

## 2020-04-13 RX ADMIN — Medication 100 MILLIGRAM(S): at 04:28

## 2020-04-13 RX ADMIN — Medication 200 MILLIGRAM(S): at 11:17

## 2020-04-13 RX ADMIN — Medication 1000 MILLIGRAM(S): at 11:17

## 2020-04-13 RX ADMIN — ENOXAPARIN SODIUM 40 MILLIGRAM(S): 100 INJECTION SUBCUTANEOUS at 11:17

## 2020-04-13 NOTE — CHART NOTE - NSCHARTNOTEFT_GEN_A_CORE
Source: Patient [ ]  Family [ ]   other [x ]    Current Diet: Diet, DASH/TLC:   Sodium & Cholesterol Restricted  Consistent Carbohydrate {Evening Snacks} (04-02-20 @ 15:02)    PO intake: Pt tolerating diet per documentation    Current Weight:   (3/28) 272.9#  -Monitor wts. b/l ankle, b/l leg, b/l foot 1+ edema noted.     Pertinent Medications: MEDICATIONS  (STANDING):  ALBUTerol    90 MICROgram(s) HFA Inhaler 2 Puff(s) Inhalation every 6 hours  ascorbic acid 1000 milliGRAM(s) Oral daily  benzonatate 100 milliGRAM(s) Oral every 8 hours  enoxaparin Injectable 40 milliGRAM(s) SubCutaneous every 12 hours  levothyroxine 137 MICROGram(s) Oral daily  sertraline 100 milliGRAM(s) Oral daily  thiamine 200 milliGRAM(s) Oral daily    MEDICATIONS  (PRN):  acetaminophen   Tablet .. 650 milliGRAM(s) Oral every 6 hours PRN Temp greater or equal to 38C (100.4F), Mild Pain (1 - 3)  aluminum hydroxide/magnesium hydroxide/simethicone Suspension 30 milliLiter(s) Oral every 4 hours PRN Dyspepsia  ondansetron Injectable 4 milliGRAM(s) IV Push every 6 hours PRN Nausea    Pertinent Labs: no recent labs    Skin: Intact    Nutrition focused physical exam unable to be conducted - found signs of malnutrition [ ]absent [ ]present    Subcutaneous fat loss: [ ] Orbital fat pads region, [ ]Buccal fat region, [ ]Triceps region,  [ ]Ribs region    Muscle wasting: [ ]Temples region, [ ]Clavicle region, [ ]Shoulder region, [ ]Scapula region, [ ]Interosseous region,  [ ]thigh region, [ ]Calf region    Estimated Needs:   [ x] no change since previous assessment  [ ] recalculated:     Current Nutrition Diagnosis: Pt remains at high nutrition risk secondary to Increased Nutrient Needs related to increased physiological demand for a nutrient as evidenced by positive COVID-19. Pt tolerating diet per documentation. Last documented BM 4/5, BM at baseline per documentation. RD to remain available.     Recommendations:   1) Encourage adherence to nutrition rx  2) Encourage HBV protein  3) Monitor wts and labs    Monitoring and Evaluation:   [ x] PO intake [ x] Tolerance to diet prescription [X] Weights  [X] Follow up per protocol [X] Labs:

## 2020-05-18 PROBLEM — S06.9X9A UNSPECIFIED INTRACRANIAL INJURY WITH LOSS OF CONSCIOUSNESS OF UNSPECIFIED DURATION, INITIAL ENCOUNTER: Chronic | Status: ACTIVE | Noted: 2020-03-28

## 2020-05-18 PROBLEM — I10 ESSENTIAL (PRIMARY) HYPERTENSION: Chronic | Status: ACTIVE | Noted: 2020-03-28

## 2020-05-24 ENCOUNTER — EMERGENCY (EMERGENCY)
Facility: HOSPITAL | Age: 50
LOS: 1 days | Discharge: HOME | End: 2020-05-24
Attending: EMERGENCY MEDICINE
Payer: COMMERCIAL

## 2020-05-24 VITALS
HEART RATE: 96 BPM | TEMPERATURE: 98 F | HEIGHT: 62 IN | RESPIRATION RATE: 18 BRPM | SYSTOLIC BLOOD PRESSURE: 133 MMHG | WEIGHT: 272.93 LBS | OXYGEN SATURATION: 97 % | DIASTOLIC BLOOD PRESSURE: 79 MMHG

## 2020-05-24 DIAGNOSIS — Z98.890 OTHER SPECIFIED POSTPROCEDURAL STATES: Chronic | ICD-10-CM

## 2020-05-24 LAB
ALBUMIN SERPL ELPH-MCNC: 4.3 G/DL — SIGNIFICANT CHANGE UP (ref 3.3–5.2)
ALP SERPL-CCNC: 92 U/L — SIGNIFICANT CHANGE UP (ref 40–120)
ALT FLD-CCNC: 22 U/L — SIGNIFICANT CHANGE UP
ANION GAP SERPL CALC-SCNC: 14 MMOL/L — SIGNIFICANT CHANGE UP (ref 5–17)
AST SERPL-CCNC: 26 U/L — SIGNIFICANT CHANGE UP
BASOPHILS # BLD AUTO: 0.04 K/UL — SIGNIFICANT CHANGE UP (ref 0–0.2)
BASOPHILS NFR BLD AUTO: 0.4 % — SIGNIFICANT CHANGE UP (ref 0–2)
BILIRUB SERPL-MCNC: 0.4 MG/DL — SIGNIFICANT CHANGE UP (ref 0.4–2)
BLD GP AB SCN SERPL QL: SIGNIFICANT CHANGE UP
BUN SERPL-MCNC: 16 MG/DL — SIGNIFICANT CHANGE UP (ref 8–20)
CALCIUM SERPL-MCNC: 9.5 MG/DL — SIGNIFICANT CHANGE UP (ref 8.6–10.2)
CHLORIDE SERPL-SCNC: 100 MMOL/L — SIGNIFICANT CHANGE UP (ref 98–107)
CK MB CFR SERPL CALC: 6.2 NG/ML — SIGNIFICANT CHANGE UP (ref 0–6.7)
CK SERPL-CCNC: 192 U/L — HIGH (ref 25–170)
CO2 SERPL-SCNC: 24 MMOL/L — SIGNIFICANT CHANGE UP (ref 22–29)
CREAT SERPL-MCNC: 0.64 MG/DL — SIGNIFICANT CHANGE UP (ref 0.5–1.3)
EOSINOPHIL # BLD AUTO: 0 K/UL — SIGNIFICANT CHANGE UP (ref 0–0.5)
EOSINOPHIL NFR BLD AUTO: 0 % — SIGNIFICANT CHANGE UP (ref 0–6)
GLUCOSE SERPL-MCNC: 86 MG/DL — SIGNIFICANT CHANGE UP (ref 70–99)
HCG SERPL-ACNC: <4 MIU/ML — SIGNIFICANT CHANGE UP
HCT VFR BLD CALC: 42.7 % — SIGNIFICANT CHANGE UP (ref 34.5–45)
HGB BLD-MCNC: 13.3 G/DL — SIGNIFICANT CHANGE UP (ref 11.5–15.5)
IMM GRANULOCYTES NFR BLD AUTO: 0.3 % — SIGNIFICANT CHANGE UP (ref 0–1.5)
INR BLD: 1.03 RATIO — SIGNIFICANT CHANGE UP (ref 0.88–1.16)
LYMPHOCYTES # BLD AUTO: 1.92 K/UL — SIGNIFICANT CHANGE UP (ref 1–3.3)
LYMPHOCYTES # BLD AUTO: 21 % — SIGNIFICANT CHANGE UP (ref 13–44)
MCHC RBC-ENTMCNC: 27.4 PG — SIGNIFICANT CHANGE UP (ref 27–34)
MCHC RBC-ENTMCNC: 31.1 GM/DL — LOW (ref 32–36)
MCV RBC AUTO: 87.9 FL — SIGNIFICANT CHANGE UP (ref 80–100)
MONOCYTES # BLD AUTO: 0.87 K/UL — SIGNIFICANT CHANGE UP (ref 0–0.9)
MONOCYTES NFR BLD AUTO: 9.5 % — SIGNIFICANT CHANGE UP (ref 2–14)
NEUTROPHILS # BLD AUTO: 6.29 K/UL — SIGNIFICANT CHANGE UP (ref 1.8–7.4)
NEUTROPHILS NFR BLD AUTO: 68.8 % — SIGNIFICANT CHANGE UP (ref 43–77)
NT-PROBNP SERPL-SCNC: 232 PG/ML — SIGNIFICANT CHANGE UP (ref 0–300)
PLATELET # BLD AUTO: 258 K/UL — SIGNIFICANT CHANGE UP (ref 150–400)
POTASSIUM SERPL-MCNC: 4.1 MMOL/L — SIGNIFICANT CHANGE UP (ref 3.5–5.3)
POTASSIUM SERPL-SCNC: 4.1 MMOL/L — SIGNIFICANT CHANGE UP (ref 3.5–5.3)
PROT SERPL-MCNC: 7.5 G/DL — SIGNIFICANT CHANGE UP (ref 6.6–8.7)
PROTHROM AB SERPL-ACNC: 11.7 SEC — SIGNIFICANT CHANGE UP (ref 10–12.9)
RBC # BLD: 4.86 M/UL — SIGNIFICANT CHANGE UP (ref 3.8–5.2)
RBC # FLD: 14.6 % — HIGH (ref 10.3–14.5)
SODIUM SERPL-SCNC: 138 MMOL/L — SIGNIFICANT CHANGE UP (ref 135–145)
TROPONIN T SERPL-MCNC: <0.01 NG/ML — SIGNIFICANT CHANGE UP (ref 0–0.06)
WBC # BLD: 9.15 K/UL — SIGNIFICANT CHANGE UP (ref 3.8–10.5)
WBC # FLD AUTO: 9.15 K/UL — SIGNIFICANT CHANGE UP (ref 3.8–10.5)

## 2020-05-24 PROCEDURE — 85027 COMPLETE CBC AUTOMATED: CPT

## 2020-05-24 PROCEDURE — 82550 ASSAY OF CK (CPK): CPT

## 2020-05-24 PROCEDURE — 84484 ASSAY OF TROPONIN QUANT: CPT

## 2020-05-24 PROCEDURE — 93971 EXTREMITY STUDY: CPT

## 2020-05-24 PROCEDURE — 86850 RBC ANTIBODY SCREEN: CPT

## 2020-05-24 PROCEDURE — 86901 BLOOD TYPING SEROLOGIC RH(D): CPT

## 2020-05-24 PROCEDURE — 85610 PROTHROMBIN TIME: CPT

## 2020-05-24 PROCEDURE — 80053 COMPREHEN METABOLIC PANEL: CPT

## 2020-05-24 PROCEDURE — 83880 ASSAY OF NATRIURETIC PEPTIDE: CPT

## 2020-05-24 PROCEDURE — 93971 EXTREMITY STUDY: CPT | Mod: 26,LT

## 2020-05-24 PROCEDURE — 99285 EMERGENCY DEPT VISIT HI MDM: CPT

## 2020-05-24 PROCEDURE — 96374 THER/PROPH/DIAG INJ IV PUSH: CPT

## 2020-05-24 PROCEDURE — 82553 CREATINE MB FRACTION: CPT

## 2020-05-24 PROCEDURE — 93010 ELECTROCARDIOGRAM REPORT: CPT

## 2020-05-24 PROCEDURE — 93005 ELECTROCARDIOGRAM TRACING: CPT

## 2020-05-24 PROCEDURE — 84702 CHORIONIC GONADOTROPIN TEST: CPT

## 2020-05-24 PROCEDURE — 86900 BLOOD TYPING SEROLOGIC ABO: CPT

## 2020-05-24 PROCEDURE — 36415 COLL VENOUS BLD VENIPUNCTURE: CPT

## 2020-05-24 PROCEDURE — 99284 EMERGENCY DEPT VISIT MOD MDM: CPT | Mod: 25

## 2020-05-24 RX ORDER — MORPHINE SULFATE 50 MG/1
6 CAPSULE, EXTENDED RELEASE ORAL ONCE
Refills: 0 | Status: DISCONTINUED | OUTPATIENT
Start: 2020-05-24 | End: 2020-05-24

## 2020-05-24 RX ADMIN — MORPHINE SULFATE 6 MILLIGRAM(S): 50 CAPSULE, EXTENDED RELEASE ORAL at 20:30

## 2020-05-24 NOTE — ED PROVIDER NOTE - CLINICAL SUMMARY MEDICAL DECISION MAKING FREE TEXT BOX
PT with stable VS, no acute distress, non toxic appearing, tolerating PO in the ED, pt neg DVT, no acute skin findings, pt to be dc home with follow up to PCP, supportive care, educated about when to return to the ED if needed. PT verbalizes that he understands all instructions and results.
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
## 2020-05-24 NOTE — ED PROVIDER NOTE - NSFOLLOWUPCLINICS_GEN_ALL_ED_FT
[address] [place] [address]  Phone: [phone]  Fax:     [facility]  Orthopedics  [address] [place] [address]  Phone: [phone]  Fax:   Follow Up Time:

## 2020-05-24 NOTE — ED PROVIDER NOTE - PATIENT PORTAL LINK FT
You can access the FollowMyHealth Patient Portal offered by [facility] by registering at the following website: [email or web address]. By joining [employer]’s FollowMyHealth portal, you will also be able to view your health information using other applications (apps) compatible with our system.

## 2020-05-24 NOTE — ED PROVIDER NOTE - OBJECTIVE STATEMENT
PT with SPMHX of TBI, HTN, recent COVID  presents to the ED with complaint of LT calf pain that started today. Pt states that she had just finished doing yard work when she had a sudden onset of LT calf pain that she describes as moderate cramping that was non radiating constant since onset and made worse with activity improved by nothing. PT denies hormone use, smoking, long plan/car rides, Hx of clotting disorders,  STEARNS, heat palpitations, anxiety, hemoptysis.

## 2020-05-24 NOTE — ED PROVIDER NOTE - NSFOLLOWUPINSTRUCTIONS_ED_ALL_ED_FT
Patient education: Nocturnal (nighttime) leg cramps (The Basics)  View in [employer]  Written by the doctors and editors at [facility]  What are nocturnal (nighttime) leg cramps?  Nighttime leg cramps cause pain and sudden muscle tightness in the legs, feet, or both. The cramps can wake you up from sleep. They can last for many minutes or just a few seconds.    Nighttime leg cramps are common in both adults and children. But as people get older, they are more likely to get them. About half of people older than 50 get nighttime leg cramps.    What causes nighttime leg cramps?  Most nighttime leg cramps do not have a cause that doctors can find. When doctors do find causes, the causes can include:    ?Having a leg or foot structure that is different from normal – For example, having flat feet or a knee that bends in the wrong direction    ?Sitting in an awkward position or sitting too long in one position    ?Standing or walking a lot on concrete floors    ?Changes in your body's fluid balance – This can happen if you:    •Take medicines called diuretics (also called "water pills")    •Are on dialysis (a kind of treatment for kidney disease)    •Sweat too much    ?Exercising    ?Having certain conditions – For example, Parkinson disease, diabetes, or low thyroid    ?Being pregnant – Some pregnant people do not have enough of the mineral magnesium in their blood. This can cause leg cramps.    ?Taking certain medicines    Is there anything I can do on my own to feel better?  Yes. Things you can try include:    ?Riding a stationary bike for a few minutes before bed – If you normally get little exercise, this might help.    ?Doing stretching exercises (picture 1)    ?Wearing shoes with firm support, especially at the back of your foot around your heel    ?Keeping bed covers loose at the foot of your bed and not tucked in    ?Drinking plenty of water, especially if you take diuretics. (Do this only if your doctor or nurse has not told you to limit the amount of water you drink.)    ?Limiting the amount of alcohol and caffeine you drink    ?Staying cool when you exercise, and not exercising in very hot weather or hot rooms    If you get a cramp, slowly stretch the cramped muscle. To prevent more cramps, you can try:    ?Walking around or jiggling your leg or foot    ?Lying down with your legs and feet up    ?Taking a hot shower with water spraying on the cramp for 5 minutes, or taking a warm bath    ?Rubbing the cramp with ice wrapped in a towel    Should I see a doctor or nurse?  See a doctor or nurse if:    ?You wake up several times a night with leg cramps    ?Your cramps keep you from getting enough sleep    ?Your cramps are very painful    ?You have cramps in other parts of your body, such as your upper back or belly    Are there tests I should have?  Probably not. Your doctor or nurse will talk with you about your symptoms and do an exam to find out what could be causing your nighttime leg cramps. Depending on your symptoms and exam, you might also need some blood tests.    How are nighttime leg cramps treated?  Treatment is different for everyone. Most people have to try a few different things before they find a treatment that helps them.    Treatment options include:    ?Making lifestyle changes – For example, exercising differently, doing stretching exercises, wearing shoes with good support, or drinking enough fluids    ?Taking supplements – Supplements are pills, capsules, liquids, or tablets with minerals or vitamins your body needs. Tell your doctor or nurse about any minerals, vitamins, or herbal medicines you already take.    ?Stopping any medicines you take that could cause cramps. But do not stop taking any medicine unless your doctor or nurse says it is OK.    ?Medicines – Taking prescription medicines that improve sleep, relax muscles, calm overactive nerves, or help in other ways. Doctors and nurses prescribe medicines for nocturnal leg cramps only when other types of treatment do not work.    What if my child gets nocturnal leg cramps?  Nocturnal leg cramps are common in children. Talk to your child's doctor or nurse if your child:    ?Has leg cramps often    ?Cannot sleep well because of leg cramps    Nocturnal leg cramps can run in families. Tell your doctor or nurse if someone else in your family also has nocturnal leg cramps.

## 2020-06-09 ENCOUNTER — APPOINTMENT (OUTPATIENT)
Dept: UROGYNECOLOGY | Facility: CLINIC | Age: 50
End: 2020-06-09
Payer: MEDICARE

## 2020-06-09 VITALS — TEMPERATURE: 98.4 F

## 2020-06-09 DIAGNOSIS — N39.46 MIXED INCONTINENCE: ICD-10-CM

## 2020-06-09 PROCEDURE — 51725 SIMPLE CYSTOMETROGRAM: CPT

## 2020-06-09 PROCEDURE — 99214 OFFICE O/P EST MOD 30 MIN: CPT | Mod: 25
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
## 2020-06-11 ENCOUNTER — RECORD ABSTRACTING (OUTPATIENT)
Age: 50
End: 2020-06-11

## 2020-06-11 LAB — BACTERIA UR CULT: NORMAL

## 2020-06-23 ENCOUNTER — APPOINTMENT (OUTPATIENT)
Dept: UROGYNECOLOGY | Facility: CLINIC | Age: 50
End: 2020-06-23
Payer: MEDICARE

## 2020-06-23 VITALS — HEART RATE: 76 BPM | SYSTOLIC BLOOD PRESSURE: 127 MMHG | TEMPERATURE: 98.4 F | DIASTOLIC BLOOD PRESSURE: 73 MMHG

## 2020-06-23 DIAGNOSIS — N32.81 OVERACTIVE BLADDER: ICD-10-CM

## 2020-06-23 DIAGNOSIS — Z87.898 PERSONAL HISTORY OF OTHER SPECIFIED CONDITIONS: ICD-10-CM

## 2020-06-23 LAB
BILIRUB UR QL STRIP: NORMAL
CLARITY UR: CLEAR
COLLECTION METHOD: NORMAL
GLUCOSE UR-MCNC: NORMAL
HCG UR QL: 0.2 EU/DL
HGB UR QL STRIP.AUTO: NORMAL
KETONES UR-MCNC: NORMAL
LEUKOCYTE ESTERASE UR QL STRIP: NORMAL
NITRITE UR QL STRIP: NORMAL
PH UR STRIP: 6
PROT UR STRIP-MCNC: NORMAL
SP GR UR STRIP: 1.01

## 2020-06-23 PROCEDURE — 52287 CYSTOSCOPY CHEMODENERVATION: CPT

## 2020-06-23 NOTE — HISTORY OF PRESENT ILLNESS
[FreeTextEntry1] : \par BOTOX PROCEDURE\par \par Patient with mixed incontinence, and great efficacy with Botox injections in the past. Informed consent was obtained, and she understands the risks and benefits, and the unique effects of Botox. She understands risks of absorption retention, urinary tract infections, damage to the urethra and the bladder..  \par \par Instillation of lidocaine and sodium bicarb placed into the bladder and left in for 10-15 minutes prior to procedure\par \par 100 units of botox was reconstituted with 10 cc NS. The cystoscope was introduced into the bladder and the botox was injected 2mm into the detrusor muscle along the posterior bladder wall above the level of the trigone. 0.5 cc was injected per injection site for a total of 20 injections.\par \par LOt: [id number]\par

## 2020-07-14 ENCOUNTER — APPOINTMENT (OUTPATIENT)
Dept: UROGYNECOLOGY | Facility: CLINIC | Age: 50
End: 2020-07-14
Payer: MEDICARE

## 2020-07-14 VITALS — TEMPERATURE: 98.4 F | HEART RATE: 76 BPM | SYSTOLIC BLOOD PRESSURE: 145 MMHG | DIASTOLIC BLOOD PRESSURE: 92 MMHG

## 2020-07-14 DIAGNOSIS — N32.81 OVERACTIVE BLADDER: ICD-10-CM

## 2020-07-14 PROCEDURE — 99213 OFFICE O/P EST LOW 20 MIN: CPT | Mod: 25

## 2020-07-14 PROCEDURE — 51798 US URINE CAPACITY MEASURE: CPT

## 2020-07-14 NOTE — HISTORY OF PRESENT ILLNESS
[FreeTextEntry1] : This patient has detrusor instability and had 100 units bladder detrusor Botox injection on June 23, 2020 and returns for followup.\par \par Scan PVR for hesitancy symptoms has zero residual. \par \par She is quite happy with the results although cannot the percentage improvement to quantify in her response. She is however very happy and has rare errors with mild leakage. We discussed fluid intake and timed voiding to try to enhance the results but, overall she's very happy. She is a question whether she wishes the bladder adequately and the residual wound was checked found to be zero residual.\par \par She will return in 6 weeks for follow up\par \par Counseling was greater than 50 percent of encounter which included  21   minute face to face time for direct counseling.\par

## 2020-09-16 NOTE — ED ADULT TRIAGE NOTE - TEMPERATURE IN CELSIUS (DEGREES C)
Patient Requesting call with results from COVID test done 9-.  Writer has advised the caller of a callback from the nurse with 24-72 hours.    Patient Name: Arcelia Huizar  Caller Name: Patient  Callback Number:  [phone], number verified  Best Availability:   Can A Detailed Message Be Left? yes  Additional Info:   Did you confirm the message with the caller?: yes    Thank you,  Alivia Blackwell    
Patient called again regarding message below. Patient said she is supposed to go in for induction by 6:00 a.m. tomorrow so she would like a call as soon as possible. Please advise at [phone]. Patient is available anytime and it is ok to leave a detailed message.  
Pt is currently admitted  
36.7

## 2020-10-06 ENCOUNTER — APPOINTMENT (OUTPATIENT)
Dept: UROGYNECOLOGY | Facility: CLINIC | Age: 50
End: 2020-10-06
Payer: MEDICARE

## 2020-10-06 VITALS — TEMPERATURE: 98.4 F

## 2020-10-06 DIAGNOSIS — R39.11 HESITANCY OF MICTURITION: ICD-10-CM

## 2020-10-06 DIAGNOSIS — Z87.898 PERSONAL HISTORY OF OTHER SPECIFIED CONDITIONS: ICD-10-CM

## 2020-10-06 DIAGNOSIS — R35.0 FREQUENCY OF MICTURITION: ICD-10-CM

## 2020-10-06 PROCEDURE — 51798 US URINE CAPACITY MEASURE: CPT

## 2020-10-06 PROCEDURE — 99213 OFFICE O/P EST LOW 20 MIN: CPT | Mod: 25

## 2020-10-06 NOTE — HISTORY OF PRESENT ILLNESS
[FreeTextEntry1] : 50 year old Patient with Overactive bladder s/p botox of bladder june 23, 2020.  She has complete resolution of incontinence episodes and frequency markedly reduced.\par \par The patient has symptoms of hesitancy of urine and a residual volume was therefore measured by sonography and found to be 57 cc.\par \par \par She is delighted with her results.  She will return when symptoms recur.  She is aware that botox typically does not have permanent results. \par

## 2022-12-08 ENCOUNTER — NON-APPOINTMENT (OUTPATIENT)
Age: 52
End: 2022-12-08

## 2022-12-21 ENCOUNTER — APPOINTMENT (OUTPATIENT)
Dept: ORTHOPEDIC SURGERY | Facility: CLINIC | Age: 52
End: 2022-12-21
Payer: MEDICARE

## 2022-12-21 VITALS
HEIGHT: 62 IN | HEART RATE: 90 BPM | SYSTOLIC BLOOD PRESSURE: 133 MMHG | DIASTOLIC BLOOD PRESSURE: 85 MMHG | BODY MASS INDEX: 51.53 KG/M2 | WEIGHT: 280 LBS

## 2022-12-21 DIAGNOSIS — S83.242A OTHER TEAR OF MEDIAL MENISCUS, CURRENT INJURY, LEFT KNEE, INITIAL ENCOUNTER: ICD-10-CM

## 2022-12-21 PROCEDURE — 99204 OFFICE O/P NEW MOD 45 MIN: CPT

## 2022-12-21 PROCEDURE — 73562 X-RAY EXAM OF KNEE 3: CPT | Mod: LT

## 2022-12-21 NOTE — DISCUSSION/SUMMARY
[Medication Risks Reviewed] : Medication risks reviewed [Surgical risks reviewed] : Surgical risks reviewed [de-identified] : 51 y/o F pt presents with moderate medial compartmental osteoarthritis of the left knee. The nature of her condition and treatment options were discussed in detail. Based on her imaging, the pt is not a candidate for a left TKA. Based on her MRI, she does have a medial meniscus tear. The pt may be a candidate for an arthroscopy. We recommend exhausting conservative therapy options such as HA injections, cortisone injections, and antiinflammatories. She has done cortisone injection in the past which was unhelpful. She is interested in trying HA injections. We sent a script for the injections. The pt will f/u when the HA injections arrive in office. \par \par The patient has been counseled regarding the elevated risks associated with surgical complications in patients with a BMI> 35. The patient demonstrates an understanding of the increased risk. After a lengthy discussion, the patient agreed to make a coordinated effort at weight loss prior to surgical intervention. The patient understands our BMI policy and they will make a conscious effort to improve their BMI.	\par \par The percentages of success in an arthroscopy that involves a torn meniscus and arthritic changes is dependent upon how bad the arthritic changes are. Basically, removing a meniscal tear allows us to ascertain how bad the patient's articular cartilage destruction (arthritis) is. The arthroscopy cleans out any debris from the arthritic process as well as removing the meniscal tear. Approximately 75% of the patients will say that they feel relief, although their x-rays will continue to show significant arthritic changes. Arthroscopy for arthritis is a temporizing procedure, yielding subjective success (patient satisfaction) for less than two to five years. In some cases, the knee might eventually require a knee replacement for symptomatic relief. The prognostic factors that are somewhat favorable predictive values in arthroscopic debridements (removal of loose articular cartilage, loose body and inflamed synovium) of an arthritic knee are: short duration of symptoms, effusion (swelling), minimal deformity and good range of motion. The complications with any arthroscopy include the risk of anaesthetic complications and death, blood clots and pulmonary embolus, infection (less than 1%), nerve damage, by which we would mean a peroneal palsy (less than 0.1%) (small area of skin numbness is so common, we do not consider its presence a complication), injury to the popliteal artery, which is so rare that there are no statistics, but should it occur could theoretically lead to amputation, which is extremely unlikely. There is often a chance of getting a hemarthrosis (blood in the joint) but this usually resolves with local measures of icing, physical therapy, and aspiration. Reflex sympathetic dystrophy (RSD) is another extremely rare but theoretical complication. This (RSD) means that the patient has a stiff painful joint that is out of proportion to the objective pathology of the knee. Subsequently, it might require years of physical therapy before one regains a functional knee with RSD. Infrapatellar contracture syndrome (stiff joint) is sometimes reported and associated with RSD, but it usually is a result of not being aggressive in physical therapy. I think the patient understands the risk benefit ratio of arthroscopy and will think about whether they would prefer the nonoperative or surgical treatment option.

## 2022-12-21 NOTE — HISTORY OF PRESENT ILLNESS
[Worsening] : worsening [Walking] : worsened by walking [Rest] : relieved by rest [de-identified] : Patient is a 52 year old female who presents c/o left knee pain since October.  [place] and suddenly was unable to bend it.  patient went to orthopedics and had cortisone injection and PT with no relief. patient using tylenol with mild relief.  patient locates pain to medial knee. patient states walking increases pain . \par Patient using cane for ambulation

## 2022-12-21 NOTE — PHYSICAL EXAM
[de-identified] : GENERAL APPEARANCE: Well nourished and hydrated, pleasant, alert, and oriented x 3. Appears their stated age. \par HEENT: Normocephalic, extraocular eye motion intact. Nasal septum midline. Oral cavity clear. External auditory canal clear. \par RESPIRATORY: Breath sounds clear and audible in all lobes. No wheezing, No accessory muscle use.\par CARDIOVASCULAR: No apparent abnormalities. No lower leg edema. No varicosities. Pedal pulses are palpable.\par NEUROLOGIC: Sensation is normal, no muscle weakness in the upper or lower extremities.\par DERMATOLOGIC: No apparent skin lesions, moist, warm, no rash.\par SPINE: Cervical spine appears normal and moves freely; thoracic spine appears normal and moves freely; lumbosacral spine appears normal and moves freely, normal, nontender.\par MUSCULOSKELETAL: Hands, wrists, and elbows are normal and move freely, shoulders are normal and move freely.  [de-identified] : Left knee exam shows medial jointline tenderness ROM  [de-identified] : 3V xray of the left knee done in the office today and reviewed by Dr. Jean Claude Rey demonstrates moderate medial compartmental osteoarthritis \par \par \par MRI of the left knee done at [facility] on 10- shows: \par 1. Complex tear of posterior horn and body of medial meniscus with horizontal and radial components. Extrusion of body of medial meniscus. \par 2. Findings suggestive of MCL sprain; recommend clinical correlation.\par 3. Mild .oa at medial and .pf compartments. \par 4. Patellar tendinosis. \par 5. Lateral patellar tilt. Shallow trochlear groove. \par 6. Large joint effusion. \par 7. Small popliteal cyst. \par 8. Mild pes anserine bursitis. \par Diffuse marrow signal alteration in keeping with red marrow reconversion, as can be seen in the setting of anemia or conditions predisposing to anemia (smoking, respiratory disorder, obesity, diabetes, high level athlete); clinical correlation is recommended. \par

## 2022-12-21 NOTE — END OF VISIT
[FreeTextEntry3] : I, Floerntin Hess, acted solely as a scribe for Dr. Jean Claude Rey on 12/21/2022

## 2022-12-23 RX ORDER — SODIUM HYALURONATE INTRA-ARTICULAR SOLN PREF SYR 25 MG/2.5ML 25/2.5 MG/ML
25 SOLUTION PREFILLED SYRINGE INTRAARTICULAR
Qty: 3 | Refills: 0 | Status: ACTIVE | OUTPATIENT
Start: 2022-12-21

## 2023-01-09 RX ORDER — HYLAN G-F 20 16MG/2ML
16 SYRINGE (ML) INTRAARTICULAR
Qty: 6 | Refills: 0 | Status: ACTIVE | OUTPATIENT
Start: 2022-12-27

## 2023-02-13 ENCOUNTER — APPOINTMENT (OUTPATIENT)
Dept: ORTHOPEDIC SURGERY | Facility: CLINIC | Age: 53
End: 2023-02-13
Payer: MEDICARE

## 2023-02-13 VITALS
BODY MASS INDEX: 47.84 KG/M2 | HEIGHT: 62 IN | WEIGHT: 260 LBS | SYSTOLIC BLOOD PRESSURE: 130 MMHG | HEART RATE: 80 BPM | DIASTOLIC BLOOD PRESSURE: 78 MMHG

## 2023-02-13 PROCEDURE — 20610 DRAIN/INJ JOINT/BURSA W/O US: CPT | Mod: LT

## 2023-02-13 PROCEDURE — 99213 OFFICE O/P EST LOW 20 MIN: CPT | Mod: 25

## 2023-02-13 NOTE — DISCUSSION/SUMMARY
[de-identified] : Medication risks reviewed. Surgical risks reviewed. 53 y/o F pt presents with moderate medial compartmental osteoarthritis of the left knee. The nature of her condition and treatment options were discussed in detail. Based on her imaging, the pt is not a candidate for a left TKA. Based on her MRI, she does have a medial meniscus tear. The pt may be a candidate for an arthroscopy. We recommend exhausting conservative therapy options such as HA injections, cortisone injections, and antiinflammatories. She has done cortisone injection in the past which was unhelpful. She is interested in trying HA injections.\par  she opted for first the Synvisc injection she tolerated well she will return to office in 1 week for second injection.\par \par The patient has been counseled regarding the elevated risks associated with surgical complications in patients with a BMI> 35. The patient demonstrates an understanding of the increased risk. After a lengthy discussion, the patient agreed to make a coordinated effort at weight loss prior to surgical intervention. The patient understands our BMI policy and they will make a conscious effort to improve their BMI.	\par \par The percentages of success in an arthroscopy that involves a torn meniscus and arthritic changes is dependent upon how bad the arthritic changes are. Basically, removing a meniscal tear allows us to ascertain how bad the patient's articular cartilage destruction (arthritis) is. The arthroscopy cleans out any debris from the arthritic process as well as removing the meniscal tear. Approximately 75% of the patients will say that they feel relief, although their x-rays will continue to show significant arthritic changes. Arthroscopy for arthritis is a temporizing procedure, yielding subjective success (patient satisfaction) for less than two to five years. In some cases, the knee might eventually require a knee replacement for symptomatic relief. The prognostic factors that are somewhat favorable predictive values in arthroscopic debridements (removal of loose articular cartilage, loose body and inflamed synovium) of an arthritic knee are: short duration of symptoms, effusion (swelling), minimal deformity and good range of motion. The complications with any arthroscopy include the risk of anaesthetic complications and death, blood clots and pulmonary embolus, infection (less than 1%), nerve damage, by which we would mean a peroneal palsy (less than 0.1%) (small area of skin numbness is so common, we do not consider its presence a complication), injury to the popliteal artery, which is so rare that there are no statistics, but should it occur could theoretically lead to amputation, which is extremely unlikely. There is often a chance of getting a hemarthrosis (blood in the joint) but this usually resolves with local measures of icing, physical therapy, and aspiration. Reflex sympathetic dystrophy (RSD) is another extremely rare but theoretical complication. This (RSD) means that the patient has a stiff painful joint that is out of proportion to the objective pathology of the knee. Subsequently, it might require years of physical therapy before one regains a functional knee with RSD. Infrapatellar contracture syndrome (stiff joint) is sometimes reported and associated with RSD, but it usually is a result of not being aggressive in physical therapy. I think the patient understands the risk benefit ratio of arthroscopy and will think about whether they would prefer the nonoperative or surgical treatment option. \par

## 2023-02-13 NOTE — PHYSICAL EXAM
[Antalgic] : antalgic [Cane] : ambulates with cane [de-identified] : GENERAL APPEARANCE: Well nourished and hydrated, pleasant, alert, and oriented x 3. Appears their stated age. \par HEENT: Normocephalic, extraocular eye motion intact. Nasal septum midline. Oral cavity clear. External auditory canal clear. \par RESPIRATORY: Breath sounds clear and audible in all lobes. No wheezing, No accessory muscle use.\par CARDIOVASCULAR: No apparent abnormalities. No lower leg edema. No varicosities. Pedal pulses are palpable.\par NEUROLOGIC: Sensation is normal, no muscle weakness in the upper or lower extremities.\par DERMATOLOGIC: No apparent skin lesions, moist, warm, no rash.\par SPINE: Cervical spine appears normal and moves freely; thoracic spine appears normal and moves freely; lumbosacral spine appears normal and moves freely, normal, nontender.\par MUSCULOSKELETAL: Hands, wrists, and elbows are normal and move freely, shoulders are normal and move freely. \par Musculoskeletal:. Left knee exam shows medial jointline tenderness ROM is 0-100 degree. \par

## 2023-02-13 NOTE — PROCEDURE
[de-identified] : Pt received left knee Synvisc injection for o.a\par Knee injection viscosupplementation: I discussed at length with the patient the planned H.A injection for primary osteoarthritis. The risks, benefits, convalescence and alternatives were reviewed and pt consented for injection. The possible side effects discussed included but were not limited to: pain, swelling, heat, stiffness and fullness. There symptoms are generally mild but if they are extensive then contact the office. Giving pain relievers by mouth such as NSAID's or Tylenol can generally treat the reactions to injection. Rare cases of infection have been noted. Rash, hives and itching may occur post injection. If you have muscle pain or cramps, flushing and or swelling of the face, rapid heart beat, nausea, dizziness, fever, chills, headache, difficulty breathing, swelling in the arms or legs, or have a prickly feeling of your skin, contact a health care provider immediately. Following this discussion, the knee was prepped with alcohol and under sterile condition the injection was performed through a superolateral injection site with a 20 gauge needle. The needle was introduced into the joint, aspiration was performed to ensure intra-articular placement and the medication was injected. Upon withdrawal of the needle the site was cleaned with alcohol and a band aid applied. The patient tolerated the injection well and there were no adverse effects. Post injection instructions included no strenuous activity for 24 hours, cryotherapy and if there are any adverse effects to contact the office.

## 2023-02-13 NOTE — REASON FOR VISIT
[Follow-Up Visit] : a follow-up visit for [Family Member] : family member [FreeTextEntry2] : left knee pain. Synvisc#1, Lot#[id number], Exp: 5/31/25

## 2023-02-13 NOTE — HISTORY OF PRESENT ILLNESS
[Pain Location] : pain [Stable] : stable [5] : a current pain level of 5/10 [de-identified] : Patient is a 52 year old female who presents c/o left knee pain since October. [place] was unable to bend it. patient went to orthopedics and had cortisone injection and PT with no relief. patient using tylenol with mild relief. patient locates pain to medial knee. patient states walking increases pain. \par Patient using cane for ambulation \par \par MRI of the left knee done at [facility] on 10- shows: \par 1. Complex tear of posterior horn and body of medial meniscus with horizontal and radial components. Extrusion of body of medial meniscus. \par 2. Findings suggestive of MCL sprain; recommend clinical correlation.\par 3. Mild.oa at medial and.pf compartments. \par 4. Patellar tendinosis. \par 5. Lateral patellar tilt. Shallow trochlear groove. \par 6. Large joint effusion. \par 7. Small popliteal cyst. \par 8. Mild pes anserine bursitis. \par Diffuse marrow signal alteration in keeping with red marrow reconversion, as can be seen in the setting of anemia or conditions predisposing to anemia (smoking, respiratory disorder, obesity, diabetes, high level athlete); clinical correlation is recommended. \par  \par She states the symptoms are worsening. \par she would like to try h.a injection\par \par

## 2023-02-21 ENCOUNTER — APPOINTMENT (OUTPATIENT)
Dept: ORTHOPEDIC SURGERY | Facility: CLINIC | Age: 53
End: 2023-02-21
Payer: MEDICARE

## 2023-02-21 PROCEDURE — 20610 DRAIN/INJ JOINT/BURSA W/O US: CPT | Mod: LT

## 2023-02-21 NOTE — PROCEDURE
[de-identified] : Pt received left knee Synvisc injection for o.a\par Knee injection viscosupplementation: I discussed at length with the patient the planned H.A injection for primary osteoarthritis. The risks, benefits, convalescence and alternatives were reviewed and pt consented for injection. The possible side effects discussed included but were not limited to: pain, swelling, heat, stiffness and fullness. There symptoms are generally mild but if they are extensive then contact the office. Giving pain relievers by mouth such as NSAID's or Tylenol can generally treat the reactions to injection. Rare cases of infection have been noted. Rash, hives and itching may occur post injection. If you have muscle pain or cramps, flushing and or swelling of the face, rapid heart beat, nausea, dizziness, fever, chills, headache, difficulty breathing, swelling in the arms or legs, or have a prickly feeling of your skin, contact a health care provider immediately. Following this discussion, the knee was prepped with alcohol and under sterile condition the injection was performed through a superolateral injection site with a 20 gauge needle. The needle was introduced into the joint, aspiration was performed to ensure intra-articular placement and the medication was injected. Upon withdrawal of the needle the site was cleaned with alcohol and a band aid applied. The patient tolerated the injection well and there were no adverse effects. Post injection instructions included no strenuous activity for 24 hours, cryotherapy and if there are any adverse effects to contact the office.

## 2023-02-21 NOTE — REASON FOR VISIT
[Follow-Up Visit] : a follow-up visit for [Other: ____] : [unfilled] [FreeTextEntry2] : Synvisc#2, Lot#[id number], Exp: 5/31/25

## 2023-02-21 NOTE — DISCUSSION/SUMMARY
[de-identified] : Medication risks reviewed. Surgical risks reviewed. 53 y/o F pt presents with moderate medial compartmental osteoarthritis of the left knee. The nature of her condition and treatment options were discussed in detail. Based on her imaging, the pt is not a candidate for a left TKA. Based on her MRI, she does have a medial meniscus tear. The pt may be a candidate for an arthroscopy. We recommend exhausting conservative therapy options such as HA injections, cortisone injections, and antiinflammatories. She has done cortisone injection in the past which was unhelpful. She is interested in trying HA injections.\par  she opted for second Synvisc injection she tolerated well she will return to office in 1 week for 3rd injection.\par \par The patient has been counseled regarding the elevated risks associated with surgical complications in patients with a BMI> 35. The patient demonstrates an understanding of the increased risk. After a lengthy discussion, the patient agreed to make a coordinated effort at weight loss prior to surgical intervention. The patient understands our BMI policy and they will make a conscious effort to improve their BMI.	\par \par The percentages of success in an arthroscopy that involves a torn meniscus and arthritic changes is dependent upon how bad the arthritic changes are. Basically, removing a meniscal tear allows us to ascertain how bad the patient's articular cartilage destruction (arthritis) is. The arthroscopy cleans out any debris from the arthritic process as well as removing the meniscal tear. Approximately 75% of the patients will say that they feel relief, although their x-rays will continue to show significant arthritic changes. Arthroscopy for arthritis is a temporizing procedure, yielding subjective success (patient satisfaction) for less than two to five years. In some cases, the knee might eventually require a knee replacement for symptomatic relief. The prognostic factors that are somewhat favorable predictive values in arthroscopic debridements (removal of loose articular cartilage, loose body and inflamed synovium) of an arthritic knee are: short duration of symptoms, effusion (swelling), minimal deformity and good range of motion. The complications with any arthroscopy include the risk of anaesthetic complications and death, blood clots and pulmonary embolus, infection (less than 1%), nerve damage, by which we would mean a peroneal palsy (less than 0.1%) (small area of skin numbness is so common, we do not consider its presence a complication), injury to the popliteal artery, which is so rare that there are no statistics, but should it occur could theoretically lead to amputation, which is extremely unlikely. There is often a chance of getting a hemarthrosis (blood in the joint) but this usually resolves with local measures of icing, physical therapy, and aspiration. Reflex sympathetic dystrophy (RSD) is another extremely rare but theoretical complication. This (RSD) means that the patient has a stiff painful joint that is out of proportion to the objective pathology of the knee. Subsequently, it might require years of physical therapy before one regains a functional knee with RSD. Infrapatellar contracture syndrome (stiff joint) is sometimes reported and associated with RSD, but it usually is a result of not being aggressive in physical therapy. I think the patient understands the risk benefit ratio of arthroscopy and will think about whether they would prefer the nonoperative or surgical treatment option. \par

## 2023-02-27 ENCOUNTER — APPOINTMENT (OUTPATIENT)
Dept: ORTHOPEDIC SURGERY | Facility: CLINIC | Age: 53
End: 2023-02-27
Payer: MEDICARE

## 2023-02-27 DIAGNOSIS — M17.12 UNILATERAL PRIMARY OSTEOARTHRITIS, LEFT KNEE: ICD-10-CM

## 2023-02-27 PROCEDURE — 20610 DRAIN/INJ JOINT/BURSA W/O US: CPT | Mod: LT

## 2023-02-27 NOTE — REASON FOR VISIT
[Follow-Up Visit] : a follow-up visit for [FreeTextEntry2] : left knee Synvisc #3 LOT [id number] EXP 2025

## 2023-02-27 NOTE — DISCUSSION/SUMMARY
[de-identified] : Medication risks reviewed. Surgical risks reviewed. 53 y/o F pt presents with moderate medial compartmental osteoarthritis of the left knee. The nature of her condition and treatment options were discussed in detail. Based on her imaging, the pt is not a candidate for a left TKA. Based on her MRI, she does have a medial meniscus tear. The pt may be a candidate for an arthroscopy. We recommend exhausting conservative therapy options such as HA injections, cortisone injections, and antiinflammatories. She has done cortisone injection in the past which was unhelpful. She is interested in trying HA injections.\par  she opted for 3rd Synvisc injection she tolerated well she will return to office in 3 M for repeat evaluation\par \par The patient has been counseled regarding the elevated risks associated with surgical complications in patients with a BMI> 35. The patient demonstrates an understanding of the increased risk. After a lengthy discussion, the patient agreed to make a coordinated effort at weight loss prior to surgical intervention. The patient understands our BMI policy and they will make a conscious effort to improve their BMI.	\par \par The percentages of success in an arthroscopy that involves a torn meniscus and arthritic changes is dependent upon how bad the arthritic changes are. Basically, removing a meniscal tear allows us to ascertain how bad the patient's articular cartilage destruction (arthritis) is. The arthroscopy cleans out any debris from the arthritic process as well as removing the meniscal tear. Approximately 75% of the patients will say that they feel relief, although their x-rays will continue to show significant arthritic changes. Arthroscopy for arthritis is a temporizing procedure, yielding subjective success (patient satisfaction) for less than two to five years. In some cases, the knee might eventually require a knee replacement for symptomatic relief. The prognostic factors that are somewhat favorable predictive values in arthroscopic debridements (removal of loose articular cartilage, loose body and inflamed synovium) of an arthritic knee are: short duration of symptoms, effusion (swelling), minimal deformity and good range of motion. The complications with any arthroscopy include the risk of anaesthetic complications and death, blood clots and pulmonary embolus, infection (less than 1%), nerve damage, by which we would mean a peroneal palsy (less than 0.1%) (small area of skin numbness is so common, we do not consider its presence a complication), injury to the popliteal artery, which is so rare that there are no statistics, but should it occur could theoretically lead to amputation, which is extremely unlikely. There is often a chance of getting a hemarthrosis (blood in the joint) but this usually resolves with local measures of icing, physical therapy, and aspiration. Reflex sympathetic dystrophy (RSD) is another extremely rare but theoretical complication. This (RSD) means that the patient has a stiff painful joint that is out of proportion to the objective pathology of the knee. Subsequently, it might require years of physical therapy before one regains a functional knee with RSD. Infrapatellar contracture syndrome (stiff joint) is sometimes reported and associated with RSD, but it usually is a result of not being aggressive in physical therapy. I think the patient understands the risk benefit ratio of arthroscopy and will think about whether they would prefer the nonoperative or surgical treatment option. \par

## 2023-02-27 NOTE — PROCEDURE
[de-identified] : Pt received left knee Synvisc injection for o.a\par Knee injection viscosupplementation: I discussed at length with the patient the planned H.A injection for primary osteoarthritis. The risks, benefits, convalescence and alternatives were reviewed and pt consented for injection. The possible side effects discussed included but were not limited to: pain, swelling, heat, stiffness and fullness. There symptoms are generally mild but if they are extensive then contact the office. Giving pain relievers by mouth such as NSAID's or Tylenol can generally treat the reactions to injection. Rare cases of infection have been noted. Rash, hives and itching may occur post injection. If you have muscle pain or cramps, flushing and or swelling of the face, rapid heart beat, nausea, dizziness, fever, chills, headache, difficulty breathing, swelling in the arms or legs, or have a prickly feeling of your skin, contact a health care provider immediately. Following this discussion, the knee was prepped with alcohol and under sterile condition the injection was performed through a superolateral injection site with a 20 gauge needle. The needle was introduced into the joint, aspiration was performed to ensure intra-articular placement and the medication was injected. Upon withdrawal of the needle the site was cleaned with alcohol and a band aid applied. The patient tolerated the injection well and there were no adverse effects. Post injection instructions included no strenuous activity for 24 hours, cryotherapy and if there are any adverse effects to contact the office.

## 2023-06-09 ENCOUNTER — APPOINTMENT (OUTPATIENT)
Dept: ORTHOPEDIC SURGERY | Facility: CLINIC | Age: 53
End: 2023-06-09

## 2024-11-12 NOTE — DIETITIAN INITIAL EVALUATION ADULT. - OTHER INFO
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 Patient with increasing dry cough, diffuse body aches, and fever that started last week. Patient sister is COVID pos. She was taken UC and found to be febrile and hypoxic. She was then sent to the ED. In the ED she was found to have hypoxia w/ RA sat of 90, fever of 103, b/l interstitial infiltrates. She was given IVF, Zithromax, oxygen, and albuterol in ED. Patient denies CP, HA, focal weakness, rash. Spoke with pt via telephone. Pt denied any chewing/swallowing difficulty. Pt reports having a good appetite and reported consuming 100% of lunch. Pt states UBW to be 273# and denied any recent wt changes. Pt reports not following a specific diet at home. .

## 2025-07-21 ENCOUNTER — APPOINTMENT (OUTPATIENT)
Dept: RHEUMATOLOGY | Facility: CLINIC | Age: 55
End: 2025-07-21
Payer: MEDICARE

## 2025-07-21 VITALS
SYSTOLIC BLOOD PRESSURE: 132 MMHG | BODY MASS INDEX: 49.47 KG/M2 | TEMPERATURE: 97.3 F | HEART RATE: 81 BPM | OXYGEN SATURATION: 94 % | WEIGHT: 262 LBS | HEIGHT: 61 IN | DIASTOLIC BLOOD PRESSURE: 85 MMHG

## 2025-07-21 DIAGNOSIS — M25.50 PAIN IN UNSPECIFIED JOINT: ICD-10-CM

## 2025-07-21 DIAGNOSIS — M17.0 BILATERAL PRIMARY OSTEOARTHRITIS OF KNEE: ICD-10-CM

## 2025-07-21 DIAGNOSIS — M65.979 UNSPECIFIED SYNOVITIS AND TENOSYNOVITIS, UNSPECIFIED ANK/FT: ICD-10-CM

## 2025-07-21 PROCEDURE — 99204 OFFICE O/P NEW MOD 45 MIN: CPT

## 2025-07-22 LAB
ALBUMIN SERPL ELPH-MCNC: 4.6 G/DL
ALP BLD-CCNC: 121 U/L
ALT SERPL-CCNC: 15 U/L
ANION GAP SERPL CALC-SCNC: 15 MMOL/L
AST SERPL-CCNC: 25 U/L
BASOPHILS # BLD AUTO: 0.03 K/UL
BASOPHILS NFR BLD AUTO: 0.4 %
BILIRUB SERPL-MCNC: 0.4 MG/DL
BUN SERPL-MCNC: 14 MG/DL
CALCIUM SERPL-MCNC: 9.8 MG/DL
CCP AB SER IA-ACNC: <8 U/ML
CCP AB SER QL: NEGATIVE
CHLORIDE SERPL-SCNC: 104 MMOL/L
CO2 SERPL-SCNC: 25 MMOL/L
CREAT SERPL-MCNC: 0.63 MG/DL
CRP SERPL-MCNC: 7 MG/L
EGFRCR SERPLBLD CKD-EPI 2021: 104 ML/MIN/1.73M2
EOSINOPHIL # BLD AUTO: 0 K/UL
EOSINOPHIL NFR BLD AUTO: 0 %
ERYTHROCYTE [SEDIMENTATION RATE] IN BLOOD BY WESTERGREN METHOD: 21 MM/HR
GLUCOSE SERPL-MCNC: 84 MG/DL
HCT VFR BLD CALC: 44.3 %
HGB BLD-MCNC: 13.6 G/DL
IMM GRANULOCYTES NFR BLD AUTO: 0.2 %
LYMPHOCYTES # BLD AUTO: 1.44 K/UL
LYMPHOCYTES NFR BLD AUTO: 17.4 %
MAN DIFF?: NORMAL
MCHC RBC-ENTMCNC: 27.9 PG
MCHC RBC-ENTMCNC: 30.7 G/DL
MCV RBC AUTO: 90.8 FL
MONOCYTES # BLD AUTO: 0.6 K/UL
MONOCYTES NFR BLD AUTO: 7.2 %
NEUTROPHILS # BLD AUTO: 6.19 K/UL
NEUTROPHILS NFR BLD AUTO: 74.8 %
PLATELET # BLD AUTO: 194 K/UL
POTASSIUM SERPL-SCNC: 4.5 MMOL/L
PROT SERPL-MCNC: 7.6 G/DL
RBC # BLD: 4.88 M/UL
RBC # FLD: 13.7 %
RHEUMATOID FACT SERPL-ACNC: <10 IU/ML
SODIUM SERPL-SCNC: 144 MMOL/L
WBC # FLD AUTO: 8.28 K/UL